# Patient Record
Sex: FEMALE | Race: WHITE | NOT HISPANIC OR LATINO | URBAN - METROPOLITAN AREA
[De-identification: names, ages, dates, MRNs, and addresses within clinical notes are randomized per-mention and may not be internally consistent; named-entity substitution may affect disease eponyms.]

---

## 2021-01-20 ENCOUNTER — IMMUNIZATIONS (OUTPATIENT)
Dept: FAMILY MEDICINE CLINIC | Facility: HOSPITAL | Age: 76
End: 2021-01-20

## 2021-01-20 DIAGNOSIS — Z23 ENCOUNTER FOR IMMUNIZATION: Primary | ICD-10-CM

## 2021-01-20 PROCEDURE — 0001A SARS-COV-2 / COVID-19 MRNA VACCINE (PFIZER-BIONTECH) 30 MCG: CPT

## 2021-01-20 PROCEDURE — 91300 SARS-COV-2 / COVID-19 MRNA VACCINE (PFIZER-BIONTECH) 30 MCG: CPT

## 2021-02-08 ENCOUNTER — IMMUNIZATIONS (OUTPATIENT)
Dept: FAMILY MEDICINE CLINIC | Facility: HOSPITAL | Age: 76
End: 2021-02-08

## 2021-02-08 DIAGNOSIS — Z23 ENCOUNTER FOR IMMUNIZATION: Primary | ICD-10-CM

## 2021-02-08 PROCEDURE — 91300 SARS-COV-2 / COVID-19 MRNA VACCINE (PFIZER-BIONTECH) 30 MCG: CPT

## 2021-02-08 PROCEDURE — 0002A SARS-COV-2 / COVID-19 MRNA VACCINE (PFIZER-BIONTECH) 30 MCG: CPT

## 2021-06-03 ENCOUNTER — OFFICE VISIT (OUTPATIENT)
Dept: DERMATOLOGY | Facility: CLINIC | Age: 76
End: 2021-06-03
Payer: MEDICARE

## 2021-06-03 VITALS — TEMPERATURE: 97.6 F | WEIGHT: 196.5 LBS

## 2021-06-03 DIAGNOSIS — R21 RASH: Primary | ICD-10-CM

## 2021-06-03 PROCEDURE — 88305 TISSUE EXAM BY PATHOLOGIST: CPT | Performed by: STUDENT IN AN ORGANIZED HEALTH CARE EDUCATION/TRAINING PROGRAM

## 2021-06-03 PROCEDURE — 88300 SURGICAL PATH GROSS: CPT | Performed by: STUDENT IN AN ORGANIZED HEALTH CARE EDUCATION/TRAINING PROGRAM

## 2021-06-03 PROCEDURE — 11105 PUNCH BX SKIN EA SEP/ADDL: CPT | Performed by: DERMATOLOGY

## 2021-06-03 PROCEDURE — 88350 IMFLUOR EA ADDL 1ANTB STN PX: CPT | Performed by: DERMATOLOGY

## 2021-06-03 PROCEDURE — 11104 PUNCH BX SKIN SINGLE LESION: CPT | Performed by: DERMATOLOGY

## 2021-06-03 PROCEDURE — 88346 IMFLUOR 1ST 1ANTB STAIN PX: CPT | Performed by: DERMATOLOGY

## 2021-06-03 PROCEDURE — 88313 SPECIAL STAINS GROUP 2: CPT | Performed by: STUDENT IN AN ORGANIZED HEALTH CARE EDUCATION/TRAINING PROGRAM

## 2021-06-03 PROCEDURE — 99203 OFFICE O/P NEW LOW 30 MIN: CPT | Performed by: DERMATOLOGY

## 2021-06-03 RX ORDER — EZETIMIBE 10 MG/1
10 TABLET ORAL DAILY
COMMUNITY
Start: 2021-03-15

## 2021-06-03 RX ORDER — FERROUS SULFATE 325(65) MG
325 TABLET ORAL
COMMUNITY
Start: 2021-04-26 | End: 2021-10-23

## 2021-06-03 RX ORDER — ESCITALOPRAM OXALATE 10 MG/1
10 TABLET ORAL DAILY
COMMUNITY
Start: 2021-03-15

## 2021-06-03 RX ORDER — BUDESONIDE AND FORMOTEROL FUMARATE DIHYDRATE 160; 4.5 UG/1; UG/1
AEROSOL RESPIRATORY (INHALATION)
COMMUNITY
Start: 2021-02-01

## 2021-06-03 RX ORDER — ESCITALOPRAM OXALATE 10 MG/1
10 TABLET ORAL
COMMUNITY
Start: 2021-03-15 | End: 2021-06-17 | Stop reason: SDUPTHER

## 2021-06-03 RX ORDER — BUDESONIDE AND FORMOTEROL FUMARATE DIHYDRATE 160; 4.5 UG/1; UG/1
2 AEROSOL RESPIRATORY (INHALATION) 2 TIMES DAILY
COMMUNITY
Start: 2021-04-10 | End: 2021-06-17 | Stop reason: SDUPTHER

## 2021-06-03 RX ORDER — HYDROXYZINE HYDROCHLORIDE 10 MG/1
TABLET, FILM COATED ORAL
COMMUNITY
Start: 2021-05-23 | End: 2021-06-17 | Stop reason: SDUPTHER

## 2021-06-03 RX ORDER — IPRATROPIUM BROMIDE AND ALBUTEROL SULFATE 2.5; .5 MG/3ML; MG/3ML
3 SOLUTION RESPIRATORY (INHALATION)
COMMUNITY

## 2021-06-03 RX ORDER — GLIMEPIRIDE 4 MG/1
4 TABLET ORAL
COMMUNITY
Start: 2021-05-02

## 2021-06-03 RX ORDER — TIOTROPIUM BROMIDE INHALATION SPRAY 3.12 UG/1
2 SPRAY, METERED RESPIRATORY (INHALATION)
COMMUNITY
Start: 2021-02-08

## 2021-06-03 RX ORDER — CYCLOBENZAPRINE HCL 5 MG
5 TABLET ORAL 3 TIMES DAILY PRN
COMMUNITY
Start: 2021-03-15

## 2021-06-03 RX ORDER — EZETIMIBE 10 MG/1
10 TABLET ORAL
COMMUNITY
Start: 2020-12-17 | End: 2021-06-17 | Stop reason: SDUPTHER

## 2021-06-03 RX ORDER — FLUCONAZOLE 150 MG/1
TABLET ORAL
COMMUNITY
Start: 2021-03-02

## 2021-06-03 RX ORDER — LATANOPROST 50 UG/ML
SOLUTION/ DROPS OPHTHALMIC
COMMUNITY
Start: 2021-05-02 | End: 2021-06-17 | Stop reason: SDUPTHER

## 2021-06-03 RX ORDER — HYDROXYZINE HYDROCHLORIDE 10 MG/1
TABLET, FILM COATED ORAL
COMMUNITY
Start: 2021-02-22

## 2021-06-03 RX ORDER — CLOBETASOL PROPIONATE 0.5 MG/G
OINTMENT TOPICAL
COMMUNITY
Start: 2021-04-25 | End: 2021-06-17 | Stop reason: SDUPTHER

## 2021-06-03 RX ORDER — HYDROCHLOROTHIAZIDE 25 MG/1
25 TABLET ORAL
COMMUNITY
Start: 2021-01-25 | End: 2021-06-17 | Stop reason: SDUPTHER

## 2021-06-03 RX ORDER — LATANOPROST 50 UG/ML
1 SOLUTION/ DROPS OPHTHALMIC
COMMUNITY

## 2021-06-03 RX ORDER — HYDROCHLOROTHIAZIDE 25 MG/1
25 TABLET ORAL DAILY
COMMUNITY
Start: 2021-04-24

## 2021-06-03 RX ORDER — CYCLOBENZAPRINE HCL 5 MG
5 TABLET ORAL
COMMUNITY
Start: 2021-03-15 | End: 2021-06-17 | Stop reason: SDUPTHER

## 2021-06-03 RX ORDER — GLIMEPIRIDE 4 MG/1
4 TABLET ORAL
COMMUNITY
Start: 2021-02-03 | End: 2021-06-17 | Stop reason: SDUPTHER

## 2021-06-03 RX ORDER — UMECLIDINIUM 62.5 UG/1
AEROSOL, POWDER ORAL
COMMUNITY
Start: 2021-02-10 | End: 2022-05-06

## 2021-06-03 NOTE — PATIENT INSTRUCTIONS
1  RASH      Assessment and Plan:  Based on a thorough discussion of this condition and the management approach to it (including a comprehensive discussion of the known risks, side effects and potential benefits of treatment), the patient (family) agrees to implement the following specific plan:   Discussed with patient will get biopsy today and once we receive results we can go forward with treatments   Discussed probably starting oral medications like gabapentin, Norethin, Phototherapy, or Dupixent injections   Recommend patient to follow up in 2 weeks for check up  PROCEDURE NOTE:  PUNCH BIOPSY        Complications:  None      Specimen has been sent for review by Dermatopathology  Plan:  1  Instructed to keep the wound dry and covered for 24-48h and clean thereafter  2  Warning signs of infection were reviewed  3  Recommended that the patient use acetaminophen as needed for pain  4  Sutures if any should be removed in 14 days      Standard post-procedure care has been explained and has been included in written form within the patient's copy of Informed Consent          PROCEDURE NOTE:  PUNCH BIOPSY

## 2021-06-03 NOTE — PROGRESS NOTES
[facility] Note     Patient Name: Rose Griffith  Encounter Date: 06/03/2021     Have you been cared for by a [facility] in the last 3 years and, if so, which one? No    · Have you traveled outside of the [address] [place] [address] in the past 3 months or outside of the [facility] area in the last 2 weeks? No     May we call your Preferred Phone number to discuss your specific medical information? Yes     May we leave a detailed message that includes your specific medical information? Yes      Today's Chief Concerns:   Concern #1:  Rash    Concern #2:      Past Medical History:  Have you personally ever had or currently have any of the following? · Skin cancer (such as Melanoma, Basal Cell Carcinoma, Squamous Cell Carcinoma? (If Yes, please provide more detail)- No  · Eczema: No  · Psoriasis: No  · HIV/AIDS: No  · Hepatitis B or C: No  · Tuberculosis: No  · Systemic Immunosuppression such as Diabetes, Biologic or Immunotherapy, Chemotherapy, Organ Transplantation, Bone Marrow Transplantation (If YES, please provide more detail): YES, type 2 diabetes   · Radiation Treatment (If YES, please provide more detail): No  · Any other major medical conditions/concerns? (If Yes, which types)- YES, asthma     Social History:     What is/was your primary occupation? Retired      What are your hobbies/past-times? n/a    Family History:  Have any of your "first degree relatives" (parent, brother, sister, or child) had any of the following       · Skin cancer such as Melanoma or Merkel Cell Carcinoma or Pancreatic Cancer? No  · Eczema, Asthma, Hay Fever or Seasonal Allergies: YES, Asthma in the family   · Psoriasis or Psoriatic Arthritis: No  · Do any other medical conditions seem to run in your family? If Yes, what condition and which relatives?   No    Current Medications:   (please update all dermatological medications before printing patient's AVS!)      Current Outpatient Medications:     budesonide-formoterol (Symbicort) 160-4 5 mcg/act inhaler, TAKE 2 PUFFS BY MOUTH TWICE A DAY, Disp: , Rfl:     clobetasol (TEMOVATE) 0 05 % ointment, APPLY TO AREA TWICE A DAY, Disp: , Rfl:     cyclobenzaprine (FLEXERIL) 5 mg tablet, Take 5 mg by mouth, Disp: , Rfl:     escitalopram (LEXAPRO) 10 mg tablet, Take 10 mg by mouth, Disp: , Rfl:     escitalopram (LEXAPRO) 10 mg tablet, Take 10 mg by mouth daily, Disp: , Rfl:     ezetimibe (ZETIA) 10 mg tablet, Take 10 mg by mouth, Disp: , Rfl:     ferrous sulfate 325 (65 Fe) mg tablet, Take 325 mg by mouth, Disp: , Rfl:     fluticasone-vilanterol (Breo Ellipta) 200-25 MCG/INH inhaler, 1 PUFF ONCE DAILY  , Disp: , Rfl:     glimepiride (AMARYL) 4 mg tablet, Take 4 mg by mouth, Disp: , Rfl:     glimepiride (AMARYL) 4 mg tablet, Take 4 mg by mouth daily with breakfast, Disp: , Rfl:     hydrochlorothiazide (HYDRODIURIL) 25 mg tablet, Take 25 mg by mouth, Disp: , Rfl:     hydrOXYzine HCL (ATARAX) 10 mg tablet, TAKE 1 TABLET BY MOUTH TWICE A DAY AS NEEDED FOR ITCHING (TAKE AT NIGHT BEFORE BED)  , Disp: , Rfl:     hydrOXYzine HCL (ATARAX) 10 mg tablet, , Disp: , Rfl:     ipratropium-albuterol (DUO-NEB) 0 5-2 5 mg/3 mL nebulizer solution, Inhale 3 mL, Disp: , Rfl:     latanoprost (XALATAN) 0 005 % ophthalmic solution, INSTILL 1 DROP INTO BOTH EYES IN THE EVENING, Disp: , Rfl:     metFORMIN (GLUCOPHAGE) 1000 MG tablet, Take 1,000 mg by mouth 2 (two) times a day with meals Breakfast and dinner, Disp: , Rfl:     metoprolol tartrate (LOPRESSOR) 25 mg tablet, , Disp: , Rfl:     metoprolol tartrate (LOPRESSOR) 25 mg tablet, Take 25 mg by mouth, Disp: , Rfl:     Symbicort 160-4 5 MCG/ACT inhaler, Inhale 2 puffs 2 (two) times a day, Disp: , Rfl:     tiotropium (Spiriva Respimat) 2 5 MCG/ACT AERS inhaler, Inhale 2 puffs, Disp: , Rfl:     umeclidinium bromide (Incruse Ellipta) 62 5 mcg/inh AEPB inhaler, every 24 hours  , Disp: , Rfl:     Breo Ellipta 200-25 MCG/INH inhaler, TAKE 1 PUFF BY MOUTH DAILY, Disp: , Rfl:     cyclobenzaprine (FLEXERIL) 5 mg tablet, Take 5 mg by mouth 3 (three) times a day as needed, Disp: , Rfl:     ezetimibe (ZETIA) 10 mg tablet, Take 10 mg by mouth daily, Disp: , Rfl:     fluconazole (DIFLUCAN) 150 mg tablet, TAKE 1 TABLET BY MOUTH AS ONE DOSE, Disp: , Rfl:     hydrochlorothiazide (HYDRODIURIL) 25 mg tablet, Take 25 mg by mouth daily, Disp: , Rfl:     latanoprost (XALATAN) 0 005 % ophthalmic solution, Administer 1 drop to both eyes once at bedtime, Disp: , Rfl:       Review of Systems:  Have you recently had or currently have any of the following? If YES, what are you doing for the problem? · Fever, chills or unintended weight loss: No  · Sudden loss or change in your vision: No  · Nausea, vomiting or blood in your stool: No  · Painful or swollen joints: No  · Wheezing or cough: No  · Changing mole or non-healing wound: No  · Nosebleeds: No  · Excessive sweating: No  · Easy or prolonged bleeding? No  · Over the last 2 weeks, how often have you been bothered by the following problems? · Taking little interest or pleasure in doing things: 1 - Not at All  · Feeling down, depressed, or hopeless: 1 - Not at All  · Rapid heartbeat with epinephrine:  No    · FEMALES ONLY:    · Are you pregnant or planning to become pregnant? No  · Are you currently or planning to be nursing or breast feeding? No    · Any known allergies? Allergies   Allergen Reactions    Penicillins Anaphylaxis and Rash    Irbesartan Angioedema    Niacin Rash    Statins Rash         Physical Exam:     Was a chaperone (Derm Clinical Assistant) present throughout the entire Physical Exam? Yes     Did the Dermatology Team specifically  the patient on the importance of a Full Skin Exam to be sure that nothing is missed clinically?  Yes}  o Did the patient ultimately request or accept a Full Skin Exam?  Yes  o Did the patient specifically refuse to have the areas "under-the-bra" examined by the Dermatologist? No  o Did the patient specifically refuse to have the areas "under-the-underwear" examined by the Dermatologist? No    CONSTITUTIONAL:   Vitals:    06/03/21 0941   Temp: 97 6 °F (36 4 °C)   TempSrc: Tympanic   Weight: 89 1 kg (196 lb 8 oz)           PSYCH: Normal mood and affect  EYES: Normal conjunctiva  ENT: Normal lips and oral mucosa  CARDIOVASCULAR: No edema  RESPIRATORY: Normal respirations  HEME/LYMPH/IMMUNO:  No regional lymphadenopathy except as noted below in "ASSESSMENT AND PLAN BY DIAGNOSIS"    SKIN:  FULL ORGAN SYSTEM EXAM  Hair, Scalp, Ears, Face Normal except as noted below in Assessment   Neck, Cervical Chain Nodes Normal except as noted below in Assessment   Right Arm/Hand/Fingers Normal except as noted below in Assessment   Left Arm/Hand/Fingers Normal except as noted below in Assessment   Chest/Breasts/Axillae Viewed areas Normal except as noted below in Assessment   Abdomen, Umbilicus Normal except as noted below in Assessment   Back/Spine Normal except as noted below in Assessment   Groin/Genitalia/Buttocks NOT EXAMINED   Right Leg, Foot, Toes Normal except as noted below in Assessment   Left Leg, Foot, Toes Normal except as noted below in Assessment        Assessment and Plan by Diagnosis:    History of Present Condition:     Duration:  How long has this been an issue for you?    o  2 years now    Location Affected:  Where on the body is this affecting you?    o  All over the  Body    Quality:  Is there any bleeding, pain, itch, burning/irritation, or redness associated with the skin lesion? o  itchy, redness, painful, bleeding    Severity:  Describe any bleeding, pain, itch, burning/irritation, or redness on a scale of 1 to 10 (with 10 being the worst)  o  8   Timing:  Does this condition seem to be there pretty constantly or do you notice it more at specific times throughout the day?     o  comes and goes  Context:  Have you ever noticed that this condition seems to be associated with specific activities you do?    o  clothing starts to make skin itchy, unsure what is causing itchy   Modifying Factors:    o Anything that seems to make the condition worse?    -  clothing starts to make skin itchy, unsure what is causing itchy  o What have you tried to do to make the condition better? -  Clobetasol Propionate Ointment, hydroxyzine 10mg tablet, 80 patch test and came back positive for hay fever, and previously biopsied but patient states nothing came back for it   Associated Signs and Symptoms:  Does this skin lesion seem to be associated with any of the following:  o  SL AMB DERM SIGNS AND SYMPTOMS: Redness and Itching and Scratching           1  RASH    Physical Exam:   (Anatomic Location); (Size and Morphological Description); (Differential Diagnosis):  o Arms, bilateral legs and torso; Indurated nodules  Many lesions resolving with hyperpigmentation  Differential Diagnosis: Prurigo nodularis versus perforating folliculitis  Doubt DH or bullous diseases   Pertinent Positives:   Pertinent Negatives: Additional History of Present Condition:  Patient was given Prednisone in the past and it seemed to helped  Patient is currently using prescribed Hydroxyzine 10mg tablets, Clobetasol Propionate  At old dermatologist patient was Patch tested and it came back positive for hay fever       Assessment and Plan:  Based on a thorough discussion of this condition and the management approach to it (including a comprehensive discussion of the known risks, side effects and potential benefits of treatment), the patient (family) agrees to implement the following specific plan:   Discussed with patient will get biopsy today and once we receive results we can go forward with treatments   Discussed probably starting oral medications like gabapentin, and/or Phototherapy    She will inquire if available in [place] area  [facility] is valid consideration if all else fails   Recommend patient to follow up in 2 weeks for check up  PROCEDURE NOTE:  PUNCH BIOPSY      Performing Physician: Dr Montiel    Anatomic Location; Clinical Description with size (cm); Pre-Op Diagnosis:   o A; Left Lateral Arm (lesional):  indurated nodules on bilateral arms, legs and torso: Differential Diagnosis: Prurigo Nodularis versus perforating disorder versus Dermatitis herpetis  Anesthesia: 1% xylocaine with epi       Topical anesthesia: None       Indications: To indicate diagnosis and management plan  Procedure Details     Patient informed of the risks (including bleeding,scaring and infection) and benefits of the procedure explained  Verbal and written informed consent obtained  The area was prepped and draped in the usual fashion  Anesthesia was obtained with 1% lidocaine with epinephrine  The skin was then stretched perpendicular to the skin tension lines and a punch biopsy to an appropriate sampling depth was obtained with a 4 mm punch with a forceps and iris scissors  Hemostasis was obtained with 4-0 Prolene x 2 sutures  Complications:  None      Specimen has been sent for review by Dermatopathology  Plan:  1  Instructed to keep the wound dry and covered for 24-48h and clean thereafter  2  Warning signs of infection were reviewed  3  Recommended that the patient use acetaminophen as needed for pain  4  Sutures if any should be removed in 14 days      Standard post-procedure care has been explained and has been included in written form within the patient's copy of Informed Consent  PROCEDURE NOTE:  PUNCH BIOPSY      Performing Physician: Dr Montiel    Anatomic Location; Clinical Description with size (cm); Pre-Op Diagnosis:      o B; DIFF;  Left Lateral Arm (Arleth lesional):  indurated nodules on bilateral arms, legs and torso: Differential Diagnosis: Prurigo Nodularis versus perforating disorder versus Dermatitis herpetis  Anesthesia: 1% xylocaine with epi       Topical anesthesia: None       Indications: To indicate diagnosis and management plan  Procedure Details     Patient informed of the risks (including bleeding,scaring and infection) and benefits of the procedure explained  Verbal and written informed consent obtained  The area was prepped and draped in the usual fashion  Anesthesia was obtained with 1% lidocaine with epinephrine  The skin was then stretched perpendicular to the skin tension lines and a punch biopsy to an appropriate sampling depth was obtained with a 3 mm punch with a forceps and iris scissors  Hemostasis was obtained with 4-0 Prolene x 1 sutures  Complications:  None      Specimen has been sent for review by Dermatopathology  Plan:  1  Instructed to keep the wound dry and covered for 24-48h and clean thereafter  2  Warning signs of infection were reviewed  3  Recommended that the patient use acetaminophen as needed for pain  4  Sutures if any should be removed in 14 days      Standard post-procedure care has been explained and has been included in written form within the patient's copy of Informed Consent      Scribe Attestation    I,:  Shaq Denny MA am acting as a scribe while in the presence of the attending physician :       I,:  Prasad To MD personally performed the services described in this documentation    as scribed in my presence :

## 2021-06-08 NOTE — RESULT ENCOUNTER NOTE
DERMATOPATHOLOGY RESULT NOTE    Results reviewed by ordering physician  Called patient to personally discuss results  Non working number      Instructions for Clinical Derm Team:   (remember to route Result Note to appropriate staff):    Patient has non working number  preliminiary biopsy shows allergic dermatitis but immuno still pending  Result & Plan by Specimen:    Specimen A: benign  Plan: await immuno        Final Diagnosis  A  Skin, left lateral arm, punch biopsy:     Spongiotic dermatitis with papillary dermal edema and superficial perivascular lymphocytic infiltrate with scattered eosinophils (see note)      Note: Findings consistent with trauma/irritation are seen  In the appropriate clinical context, the histopathologic findings are consistent with eczematous dermatitis or allergic contact dermatitis  Clinical pathological correlation, including immunofluorescence studies, is advised  Pathogenic microorganisms are not seen on PAS stain  Multiple levels examined            B  Skin, left lateral arm, DIF:     Specimen entirely sent to [facility] for immunofluorescence analysis  A separate report will be issued by [facility] following completion of their studies

## 2021-06-09 ENCOUNTER — TELEPHONE (OUTPATIENT)
Dept: DERMATOLOGY | Facility: CLINIC | Age: 76
End: 2021-06-09

## 2021-06-09 NOTE — TELEPHONE ENCOUNTER
Patient is being seen by you Dr Liam Mejia on 6/17/2021 to follow up on rash and suture removals  (on OVS slot)

## 2021-06-11 LAB — MISCELLANEOUS LAB TEST RESULT: NORMAL

## 2021-06-17 ENCOUNTER — OFFICE VISIT (OUTPATIENT)
Dept: DERMATOLOGY | Facility: CLINIC | Age: 76
End: 2021-06-17
Payer: MEDICARE

## 2021-06-17 DIAGNOSIS — Z48.02 VISIT FOR SUTURE REMOVAL: ICD-10-CM

## 2021-06-17 DIAGNOSIS — L20.9 ATOPIC DERMATITIS, UNSPECIFIED TYPE: Primary | ICD-10-CM

## 2021-06-17 PROCEDURE — 99213 OFFICE O/P EST LOW 20 MIN: CPT | Performed by: DERMATOLOGY

## 2021-06-17 RX ORDER — CLOBETASOL PROPIONATE 0.5 MG/G
OINTMENT TOPICAL 2 TIMES DAILY
Qty: 60 G | Refills: 0 | Status: SHIPPED | OUTPATIENT
Start: 2021-06-17 | End: 2021-09-20

## 2021-06-17 NOTE — PATIENT INSTRUCTIONS
Assessment and Plan:  Based on a thorough discussion of this condition and the management approach to it (including a comprehensive discussion of the known risks, side effects and potential benefits of treatment), the patient (family) agrees to implement the following specific plan:   We will order Dupixent, we will work on prior authorization with your insurance and will we contact you with response   Side effects and risk discussed     Continue to apply Clobetasol only to thick bumps, until Dupixent is approved  Assessment and Plan:   Atopic Dermatitis is a chronic, itchy skin condition that is very common in children but may occur at any age  It is also known as eczema or atopic eczema   It is the most common form of dermatitis  Atopic dermatitis usually occurs in people who have an atopic tendency    This means they may develop any or all of these closely linked conditions: Atopic dermatitis, asthma, hay fever (allergic rhinitis), eosinophilic esophagitis, and gastroenteritis  Often these conditions run within families with a parent, child or sibling also affected  A family history of asthma, eczema or hay fever is particularly useful in diagnosing atopic dermatitis in infants  Atopic dermatitis arises because of a complex interaction of genetic and environmental factors  These include defects in skin barrier function making the skin more susceptible to irritation by soap and other contact irritants, the weather, temperature and non-specific triggers  There is also an element of immune system dysregulation that is often present  By definition, it is chronic and has a "waxing-waning" nature; flares should be expected but with good education and treatment strategies can be minimized  Some specific tips we discussed:   Dry skin care     Usingonly mild cleansers (hypoallergenic and without fragrances) and fragrance free detergent (not unscented products which contain a masking agent); we discussed avoiding irritants/fragranced products   The importance of regular application of moisturizers daily (at least 3 times a day)   The known and theoretical side effects of steroids at length, including but not limited to atrophy of skin and increased pressure in eye (glaucoma) and clouding of the eye's lens (cataracts) if used in or around the eye for extended durations   The specific over-the-counter interventions and medications   Side effects, risks and benefits of topical and oral medications discussed  EDUCATION AS INTERVENTION! WHAT IS ATOPIC DERMATITIS? Atopic dermatitis (also called eczema) is a condition of the skin where the skin is dry, red, and itchy  The main function of the skin is to provide a barrier from the environment and is also the first defense of the immune system  In atopic dermatitis the skin barrier is decreased or disrupted, and the skin is easily irritated  As a result, moisture escapes the skin more easily, and environmental allergens and microbes can enter the skin more easily  Consequently, the skin's immune system is altered  If there are increased allergic type cells in the skin, the skin may become red and hyper-excitable   This leads to itching and a subsequent rash  WHY DO PEOPLE GET ATOPIC DERMATITIS? There is no single answer because many factors are involved  It is likely a combination of genetic makeup and environmental triggers and/or exposures  Excessive drying or sweating of the skin, Irritating soaps, dust mites, and pet dander are some of the more common triggers  There is no blood test that can be done to confirm this diagnosis  The history and appearance of the skin is usually sufficient for a diagnosis  However, in some cases if the rash does not fit with the history or respond appropriately to treatment, a skin biopsy may be helpful    Many children do outgrow atopic dermatitis or get better; however, many continue to have sensitive skin into adulthood  Asthma and hay fever are often seen in many patients with atopic dermatitis; however, asthma flares do not necessarily occur at the same time as skin flares  PREVENTING FLARES OF ATOPIC DERMATITIS  The first step is to maintain the skin's barrier function  Keep the skin well moisturized  Avoid irritants and triggers  Use prescribed medicine when there are red or rough areas to help the skin to return to normal as quickly as possible  Try to limit scratching  If you keep the skin well moisturized, and avoid coming in contact with things you know irritate your child's skin, there will be less flares  However, some flares of atopic dermatitis are beyond your control  You should work with your health care provider to come up with a plan that minimizes flares while minimizing long term use of medications that suppress the immune system  WHAT ARE SOME OF THE TRIGGERS? Triggers are different for different people  The most common triggers are:   Heat and sweat for some individuals, cold weather for others   House dust mites, pet fur   Wool; synthetic fabrics like nylon; dyed fabrics   Tobacco smoke    Fragrances in: shampoos, soaps, lotions, laundry detergents, fabric softeners   Saliva or prolonged exposure to water  WHAT ABOUT FOOD ALLERGIES? This is a very controversial topic, as many believe that food allergies are responsible for skin flares  In some cases, specific foods may cause worsening of atopic dermatitis; however this occurs in a minority of cases and usually happens within a few hours of ingestion  While food allergy is more common in children with eczema, foods are specific triggers for flares in only a small percentage of children  If you notice that the skin flares after certain foods you can see if eliminating one food at time makes a difference, as long as your child can still enjoy a well-balanced diet     There are blood (RAST) and skin (PRICK) tests that can check for allergies, but they are often positive in children who are not truly allergic  Therefore it is important that you work with your allergist and dermatologist to determine which foods are relevant and causing true symptoms  Extreme food elimination diets without the guidance of your doctor, which have become more popular in recent years, may even result in worsening of the skin rash due to malnutrition and avoidance of essential nutrients  TREATMENT  Treatments are aimed at minimizing exposure to irritating factors and decreasing  the skin inflammation which results in an itchy rash  There are many different treatment options, which depend on your child's rash, its location, and severity  Topical treatments include corticosteroids and steroid-like creams such as Protopic, Elidel, and Eucrisa, which are believed to not thin the skin  Please read the discussions below regarding risks and benefits of all of these creams  Occasionally bacterial or viral infections can occur which flare the skin and require oral and/or topical antibiotics or antivirals  In some cases bleach baths 2-3 times weekly can be helpful to prevent recurrent infection  For severe disease, strong oral medications such as corticosteroids, methotrexate or azathioprine (Imuran) may be needed  These medications require close monitoring and follow-up  You should discuss the risks/ benefits/alternatives of these medications with your health care provider to come up with the best treatment plan for your child  1) Use moisturizer all over the entire body at least THREE TIMES a day  This keeps the skin moisturized to restore the barrier function  Find a cream or ointment that your child likes - this is the most important  The medicines do not work in the bottle  The thicker the moisturizer, generally the better barrier it provides    Ointments often moisturize better than creams; and creams work better than lotions  Lotions are more useful during the summer when thick greasy ointments are uncomfortable  If you put moisturizer on the skin after bathing, while the skin is damp, it is twice as effective  The moisturizer provides a seal holding the water in the skin  You may bathe your child in warm - not hot - water, for short periods of time (no more than 5-10 minutes at a time) once a day if they like  Lightly pat your child dry with a towel and, while the skin is still damp, (within 3 minutes) apply a moisturizer from head to toe  If your child is using a medicated cream, apply it and allow it to absorb completely BEFORE you apply the moisturizer  2) Apply the prescription medication TWICE A DAY to only the red, rough areas on the skin OR AS [place]  Put the medication on your fingers and gently rub it into the areas  Usually the medicine will help an area within a few days time  Try to put the medicine on for two days after you have noticed that the redness is no longer present; this will help the redness from returning  The severity of the rash and the strength and usage of the medication will determine how quickly you see improvement  It is important that you do not overuse steroid creams, and if you notice a thin, shiny appearance to the skin or broken blood vessels, you should stop using the cream and consult your health care provider regarding possible overuse/overthinning of the skin  The face, armpits and groin have particularly thin and sensitive skin and are therefore most at risk for bad results if steroids are over-used in these sites  3) Avoid triggers  Some children have specific things that trigger itching and rashes, while others may have none that can be identified  It may require a little bit of trial and error to see what applies to your child  Also, triggers can change over time for your child   The most common triggers are listed above; start with these  Avoid the use of fabric softeners in the washing machine or dryer sheets (unless they are fragrance-free)  Try to use laundry detergents, soaps and shampoos that are fragrance-free  You may find it helpful to double-rinse your clothes  Some children are sensitive to house dust mites and they may benefit from a plastic mattress wrap  While food allergy is more common in children with eczema, foods are specific triggers for flares in only a small percentage of children  If you notice that the skin flares after certain foods you can see if eliminating one food at time makes a difference, as long as your child can still enjoy a well-balanced diet  4) Consider using a medication like an anti-histamine by mouth to help control the itching  Scratching only makes the skin more reactive and the barrier function even more disrupted  It can cause both children and their parents to lose sleep! There are different types of anti-itch medications  Some cause more drowsiness than others  Both types are acceptable depending on your child and your preference  Start with Benadryl and if that does not work, ask for a prescription antihistamine      5) About the prescription creams:  Corticosteroid creams and ointments (generally things with "-one" or "hao" on the end of their names): The strength of the cream or ointment depends on the name of the active ingredient  The numbers at the end do not indicate the relative strength  Thus triamcinolone 0 1% ointment, considered a mid-strength corticosteroid, is much stronger than hydrocortisone 1% even though the number following the name is much lower  Topical corticosteroids are very effective in treating atopic dermatitis  When used in the manner prescribed (to rashy areas of skin and for no more than a few weeks at a time to any one area) they are very safe    These are corticosteroids and are anti-inflammatory, not the anabolic steroids like those used illegally by some athletes  Topical non-steroid creams and ointments (immunomodulators): These creams and ointments are also called topical calcineurin inhibitors (TCIs)  These include Protopic ointment and Elidel cream  Crisaborole 2% Xu Pineda) is a prescription ointment that targets an enzyme called PDE4 (phosphodiesterase 4)  It is used on the skin topically to treat mild-to-moderate eczema in adults and children 3years of age and older  In total, these nonsteroidal prescriptions are used to help decrease itching and redness in the skin  They are not as strong as most steroid creams; however, it is believed that they do not thin the skin when overused  They are generally used as second-line medications, though they may be used alone or in conjunction with topical steroids  In sensitive areas such as the face, underarms or groin, they are often recommended  They can sting inflamed skin, but are generally well tolerated once the skin is healing  The FDA placed a black-box warning on both Elidel and Protopic in 2006 based on animal studies using the medications  Some animals developed skin cancer and lymphoma  Subsequently, the FDA released a statement that there is no causal relationship between the two medications and cancer  Because of this concern, there are ongoing studies to evaluate this relationship in humans  So far, there are studies that support the safety of these medications  One showed that the rates of cancer in patient using these medications topically were less than the rates of the general population and another showed that in patient's using the medication over a large area of the body, the levels of the medication in the blood was undetectable      As for Eucrisa, this product is only approved for the topical treatment of mild-to-moderate eczema in patients 3years of age and older; use of the medication in kids younger than 2 is considered off label and has not been formally studied  Burning and stinging are the most commonly reported side effects of this medication  Rarely, this product has been known to cause hives and hypersensitivity reactions; discontinue its use if you develop severe itching, swelling, or redness in the area of application

## 2021-06-17 NOTE — PROGRESS NOTES
[facility] Follow Up Note    Patient Name: Rose Griffith  Encounter Date: 06/17/21    Today's Chief Concerns:   Concern #1:  Follow up       Current Medications:    Current Outpatient Medications:     liraglutide (SAXENDA) injection, Inject 1 8 mg under the skin, Disp: , Rfl:     Breo Ellipta 200-25 MCG/INH inhaler, TAKE 1 PUFF BY MOUTH DAILY, Disp: , Rfl:     budesonide-formoterol (Symbicort) 160-4 5 mcg/act inhaler, TAKE 2 PUFFS BY MOUTH TWICE A DAY, Disp: , Rfl:     clobetasol (TEMOVATE) 0 05 % ointment, APPLY TO AREA TWICE A DAY, Disp: , Rfl:     cyclobenzaprine (FLEXERIL) 5 mg tablet, Take 5 mg by mouth 3 (three) times a day as needed, Disp: , Rfl:     escitalopram (LEXAPRO) 10 mg tablet, Take 10 mg by mouth daily, Disp: , Rfl:     ezetimibe (ZETIA) 10 mg tablet, Take 10 mg by mouth daily, Disp: , Rfl:     ferrous sulfate 325 (65 Fe) mg tablet, Take 325 mg by mouth, Disp: , Rfl:     fluconazole (DIFLUCAN) 150 mg tablet, TAKE 1 TABLET BY MOUTH AS ONE DOSE, Disp: , Rfl:     glimepiride (AMARYL) 4 mg tablet, Take 4 mg by mouth daily with breakfast, Disp: , Rfl:     hydrochlorothiazide (HYDRODIURIL) 25 mg tablet, Take 25 mg by mouth daily, Disp: , Rfl:     hydrOXYzine HCL (ATARAX) 10 mg tablet, TAKE 1 TABLET BY MOUTH TWICE A DAY AS NEEDED FOR ITCHING (TAKE AT NIGHT BEFORE BED)  , Disp: , Rfl:     ipratropium-albuterol (DUO-NEB) 0 5-2 5 mg/3 mL nebulizer solution, Inhale 3 mL, Disp: , Rfl:     latanoprost (XALATAN) 0 005 % ophthalmic solution, Administer 1 drop to both eyes once at bedtime, Disp: , Rfl:     metoprolol tartrate (LOPRESSOR) 25 mg tablet, , Disp: , Rfl:     tiotropium (Spiriva Respimat) 2 5 MCG/ACT AERS inhaler, Inhale 2 puffs, Disp: , Rfl:     umeclidinium bromide (Incruse Ellipta) 62 5 mcg/inh AEPB inhaler, every 24 hours  , Disp: , Rfl:     CONSTITUTIONAL:   There were no vitals filed for this visit        Specific Alerts:    Have you been seen by a [facility]ologist in the last 3 years? YES    Are you pregnant or planning to become pregnant? No    Are you currently or planning to be nursing or breast feeding? No    Allergies   Allergen Reactions    Penicillins Anaphylaxis and Rash    Irbesartan Angioedema    Niacin Rash    Statins Rash       May we call your Preferred Phone number to discuss your specific medical information? YES    May we leave a detailed message that includes your specific medical information? YES    Have you traveled outside of the [facility] in the past 3 months? No    Do you currently have a pacemaker or defibrillator? No    Do you have any artificial heart valves, joints, plates, screws, rods, stents, pins, etc? No    Do you require any medications prior to a surgical procedure? No    Are you taking any medications that cause you to bleed more easily ("blood thinners") No    Have you ever experienced a rapid heartbeat with epinephrine? No    Review of Systems:  Have you recently had or currently have any of the following?     · Fever or chills: No  · Night Sweats: No  · Headaches: No  · Weight Gain: No  · Weight Loss: No  · Blurry Vision: No  · Nausea: No  · Vomiting: No  · Diarrhea: No  · Blood in Stool: No  · Abdominal Pain: No  · Itchy Skin: YES  · Painful Joints: No  · Swollen Joints: No  · Muscle Pain: No  · Irregular Mole: No  · Sun Burn: No  · Dry Skin: YES  · Skin Color Changes: No  · Scar or Keloid: No  · Cold Sores/Fever Blisters: No  · Bacterial Infections/MRSA: No  · Anxiety: No  · Depression: No  · Suicidal or Homicidal Thoughts: No      PSYCH: Normal mood and affect  EYES: Normal conjunctiva  ENT: Normal lips and oral mucosa  CARDIOVASCULAR: No edema  RESPIRATORY: Normal respirations  HEME/LYMPH/IMMUNO:  No regional lymphadenopathy except as noted below in ASSESSMENT AND PLAN BY DIAGNOSIS    FULL ORGAN SYSTEM SKIN EXAM (SKIN)   Hair, Scalp, Ears, Face Normal except as noted below in Assessment   Neck, Cervical Chain Nodes Normal except as noted below in Assessment   Right Arm/Hand/Fingers Normal except as noted below in Assessment   Left Arm/Hand/Fingers Normal except as noted below in Assessment   Chest/Breasts/Axillae Viewed areas Normal except as noted below in Assessment   Abdomen, Umbilicus Normal except as noted below in Assessment   Back/Spine Normal except as noted below in Assessment   Groin/Genitalia/Buttocks Viewed areas Normal except as noted below in Assessment   Right Leg, Foot, Toes Normal except as noted below in Assessment   Left Leg, Foot, Toes Normal except as noted below in Assessment     ATOPIC DERMATITIS of unknown etiology   Physical Exam:   Anatomic Location Affected & Morphological Description:  Arms, bilateral legs and torso; Indurated nodules  Many lesions resolving with hyperpigmentation  Differential Diagnosis: Prurigo nodularis versus perforating folliculitis  Doubt DH or bullous diseases   Severity: moderate   Pertinent Positives:   Pertinent Negatives: Additional History of Present Condition:  Patient is present to follow up on ongoing rash, previously biopsied present today to follow up on results, stopped metformin  Assessment and Plan:  Based on a thorough discussion of this condition and the management approach to it (including a comprehensive discussion of the known risks, side effects and potential benefits of treatment), the patient (family) agrees to implement the following specific plan:   We will order Dupixent, we will work on prior authorization with your insurance and will we contact you with response   Side effects and risk discussed     Continue to apply Clobetasol only to thick bumps, until Dupixent is approved  Indications for use is treatment failure with topical steroids, phototherapy  And risks of ongoing secondary infection of legs    I note that due to diamtes and comordities other forms of immunosuppression are not clinical option  Assessment and Plan:   Atopic Dermatitis is a chronic, itchy skin condition that is very common in children but may occur at any age  It is also known as eczema or atopic eczema   It is the most common form of dermatitis  Atopic dermatitis usually occurs in people who have an atopic tendency    This means they may develop any or all of these closely linked conditions: Atopic dermatitis, asthma, hay fever (allergic rhinitis), eosinophilic esophagitis, and gastroenteritis  Often these conditions run within families with a parent, child or sibling also affected  A family history of asthma, eczema or hay fever is particularly useful in diagnosing atopic dermatitis in infants  Atopic dermatitis arises because of a complex interaction of genetic and environmental factors  These include defects in skin barrier function making the skin more susceptible to irritation by soap and other contact irritants, the weather, temperature and non-specific triggers  There is also an element of immune system dysregulation that is often present  By definition, it is chronic and has a "waxing-waning" nature; flares should be expected but with good education and treatment strategies can be minimized  Some specific tips we discussed:   Dry skin care   Usingonly mild cleansers (hypoallergenic and without fragrances) and fragrance free detergent (not unscented products which contain a masking agent); we discussed avoiding irritants/fragranced products   The importance of regular application of moisturizers daily (at least 3 times a day)   The known and theoretical side effects of steroids at length, including but not limited to atrophy of skin and increased pressure in eye (glaucoma) and clouding of the eye's lens (cataracts) if used in or around the eye for extended durations   The specific over-the-counter interventions and medications     Side effects, risks and benefits of topical and oral medications discussed  EDUCATION AS INTERVENTION! WHAT IS ATOPIC DERMATITIS? Atopic dermatitis (also called eczema) is a condition of the skin where the skin is dry, red, and itchy  The main function of the skin is to provide a barrier from the environment and is also the first defense of the immune system  In atopic dermatitis the skin barrier is decreased or disrupted, and the skin is easily irritated  As a result, moisture escapes the skin more easily, and environmental allergens and microbes can enter the skin more easily  Consequently, the skin's immune system is altered  If there are increased allergic type cells in the skin, the skin may become red and hyper-excitable   This leads to itching and a subsequent rash  WHY DO PEOPLE GET ATOPIC DERMATITIS? There is no single answer because many factors are involved  It is likely a combination of genetic makeup and environmental triggers and/or exposures  Excessive drying or sweating of the skin, Irritating soaps, dust mites, and pet dander are some of the more common triggers  There is no blood test that can be done to confirm this diagnosis  The history and appearance of the skin is usually sufficient for a diagnosis  However, in some cases if the rash does not fit with the history or respond appropriately to treatment, a skin biopsy may be helpful  Many children do outgrow atopic dermatitis or get better; however, many continue to have sensitive skin into adulthood  Asthma and hay fever are often seen in many patients with atopic dermatitis; however, asthma flares do not necessarily occur at the same time as skin flares  PREVENTING FLARES OF ATOPIC DERMATITIS  The first step is to maintain the skin's barrier function  Keep the skin well moisturized  Avoid irritants and triggers  Use prescribed medicine when there are red or rough areas to help the skin to return to normal as quickly as possible  Try to limit scratching       If you keep the skin well moisturized, and avoid coming in contact with things you know irritate your child's skin, there will be less flares  However, some flares of atopic dermatitis are beyond your control  You should work with your health care provider to come up with a plan that minimizes flares while minimizing long term use of medications that suppress the immune system  WHAT ARE SOME OF THE TRIGGERS? Triggers are different for different people  The most common triggers are:   Heat and sweat for some individuals, cold weather for others   House dust mites, pet fur   Wool; synthetic fabrics like nylon; dyed fabrics   Tobacco smoke    Fragrances in: shampoos, soaps, lotions, laundry detergents, fabric softeners   Saliva or prolonged exposure to water  WHAT ABOUT FOOD ALLERGIES? This is a very controversial topic, as many believe that food allergies are responsible for skin flares  In some cases, specific foods may cause worsening of atopic dermatitis; however this occurs in a minority of cases and usually happens within a few hours of ingestion  While food allergy is more common in children with eczema, foods are specific triggers for flares in only a small percentage of children  If you notice that the skin flares after certain foods you can see if eliminating one food at time makes a difference, as long as your child can still enjoy a well-balanced diet  There are blood (RAST) and skin (PRICK) tests that can check for allergies, but they are often positive in children who are not truly allergic  Therefore it is important that you work with your allergist and dermatologist to determine which foods are relevant and causing true symptoms  Extreme food elimination diets without the guidance of your doctor, which have become more popular in recent years, may even result in worsening of the skin rash due to malnutrition and avoidance of essential nutrients      TREATMENT  Treatments are aimed at minimizing exposure to irritating factors and decreasing  the skin inflammation which results in an itchy rash  There are many different treatment options, which depend on your child's rash, its location, and severity  Topical treatments include corticosteroids and steroid-like creams such as Protopic, Elidel, and Eucrisa, which are believed to not thin the skin  Please read the discussions below regarding risks and benefits of all of these creams  Occasionally bacterial or viral infections can occur which flare the skin and require oral and/or topical antibiotics or antivirals  In some cases bleach baths 2-3 times weekly can be helpful to prevent recurrent infection  For severe disease, strong oral medications such as corticosteroids, methotrexate or azathioprine (Imuran) may be needed  These medications require close monitoring and follow-up  You should discuss the risks/ benefits/alternatives of these medications with your health care provider to come up with the best treatment plan for your child  1) Use moisturizer all over the entire body at least THREE TIMES a day  This keeps the skin moisturized to restore the barrier function  Find a cream or ointment that your child likes - this is the most important  The medicines do not work in the bottle  The thicker the moisturizer, generally the better barrier it provides  Ointments often moisturize better than creams; and creams work better than lotions  Lotions are more useful during the summer when thick greasy ointments are uncomfortable  If you put moisturizer on the skin after bathing, while the skin is damp, it is twice as effective  The moisturizer provides a seal holding the water in the skin  You may bathe your child in warm - not hot - water, for short periods of time (no more than 5-10 minutes at a time) once a day if they like    Lightly pat your child dry with a towel and, while the skin is still damp, (within 3 minutes) apply a moisturizer from head to toe  If your child is using a medicated cream, apply it and allow it to absorb completely BEFORE you apply the moisturizer  2) Apply the prescription medication TWICE A DAY to only the red, rough areas on the skin OR AS [place]  Put the medication on your fingers and gently rub it into the areas  Usually the medicine will help an area within a few days time  Try to put the medicine on for two days after you have noticed that the redness is no longer present; this will help the redness from returning  The severity of the rash and the strength and usage of the medication will determine how quickly you see improvement  It is important that you do not overuse steroid creams, and if you notice a thin, shiny appearance to the skin or broken blood vessels, you should stop using the cream and consult your health care provider regarding possible overuse/overthinning of the skin  The face, armpits and groin have particularly thin and sensitive skin and are therefore most at risk for bad results if steroids are over-used in these sites  3) Avoid triggers  Some children have specific things that trigger itching and rashes, while others may have none that can be identified  It may require a little bit of trial and error to see what applies to your child  Also, triggers can change over time for your child  The most common triggers are listed above; start with these  Avoid the use of fabric softeners in the washing machine or dryer sheets (unless they are fragrance-free)  Try to use laundry detergents, soaps and shampoos that are fragrance-free  You may find it helpful to double-rinse your clothes  Some children are sensitive to house dust mites and they may benefit from a plastic mattress wrap  While food allergy is more common in children with eczema, foods are specific triggers for flares in only a small percentage of children    If you notice that the skin flares after certain foods you can see if eliminating one food at time makes a difference, as long as your child can still enjoy a well-balanced diet  4) Consider using a medication like an anti-histamine by mouth to help control the itching  Scratching only makes the skin more reactive and the barrier function even more disrupted  It can cause both children and their parents to lose sleep! There are different types of anti-itch medications  Some cause more drowsiness than others  Both types are acceptable depending on your child and your preference  Start with Benadryl and if that does not work, ask for a prescription antihistamine      5) About the prescription creams:  Corticosteroid creams and ointments (generally things with "-one" or "hao" on the end of their names): The strength of the cream or ointment depends on the name of the active ingredient  The numbers at the end do not indicate the relative strength  Thus triamcinolone 0 1% ointment, considered a mid-strength corticosteroid, is much stronger than hydrocortisone 1% even though the number following the name is much lower  Topical corticosteroids are very effective in treating atopic dermatitis  When used in the manner prescribed (to rashy areas of skin and for no more than a few weeks at a time to any one area) they are very safe  These are corticosteroids and are anti-inflammatory, not the anabolic steroids like those used illegally by some athletes  Topical non-steroid creams and ointments (immunomodulators): These creams and ointments are also called topical calcineurin inhibitors (TCIs)  These include Protopic ointment and Elidel cream  Crisaborole 2% Verla Hemp) is a prescription ointment that targets an enzyme called PDE4 (phosphodiesterase 4)  It is used on the skin topically to treat mild-to-moderate eczema in adults and children 3years of age and older    In total, these nonsteroidal prescriptions are used to help decrease itching and redness in the skin  They are not as strong as most steroid creams; however, it is believed that they do not thin the skin when overused  They are generally used as second-line medications, though they may be used alone or in conjunction with topical steroids  In sensitive areas such as the face, underarms or groin, they are often recommended  They can sting inflamed skin, but are generally well tolerated once the skin is healing  The FDA placed a black-box warning on both Elidel and Protopic in 2006 based on animal studies using the medications  Some animals developed skin cancer and lymphoma  Subsequently, the FDA released a statement that there is no causal relationship between the two medications and cancer  Because of this concern, there are ongoing studies to evaluate this relationship in humans  So far, there are studies that support the safety of these medications  One showed that the rates of cancer in patient using these medications topically were less than the rates of the general population and another showed that in patient's using the medication over a large area of the body, the levels of the medication in the blood was undetectable  As for Eucrisa, this product is only approved for the topical treatment of mild-to-moderate eczema in patients 3years of age and older; use of the medication in kids younger than 2 is considered off label and has not been formally studied  Burning and stinging are the most commonly reported side effects of this medication  Rarely, this product has been known to cause hives and hypersensitivity reactions; discontinue its use if you develop severe itching, swelling, or redness in the area of application  Suture removal    Date/Time: 6/17/2021 2:38 PM  Performed by: Pati Leyva MA  Authorized by: Savita Hernandez MD   Universal Protocol:  Procedure performed by:  Consent: Verbal consent obtained    Risks and benefits: risks, benefits and alternatives were discussed  Consent given by: patient  Time out: Immediately prior to procedure a "time out" was called to verify the correct patient, procedure, equipment, support staff and site/side marked as required  Timeout called at: 6/17/2021 2:38 PM   Patient understanding: patient states understanding of the procedure being performed  Patient consent: the patient's understanding of the procedure matches consent given  Procedure consent: procedure consent matches procedure scheduled  Patient identity confirmed: verbally with patient        Patient location:  Other (comment)  Location:     Laterality:  Left    Location:  Upper extremity    Upper extremity location:  Arm  Procedure details: Tools used:  Suture removal kit    Wound appearance:  No sign(s) of infection, good wound healing and clean    Number of sutures removed:  1  Post-procedure details:     Post-removal:  No dressing applied    Patient tolerance of procedure:   Tolerated well, no immediate complications      Scribe Attestation    I,:  Kelly Ray MA am acting as a scribe while in the presence of the attending physician :       I,:  Shamir Hare MD personally performed the services described in this documentation    as scribed in my presence :

## 2021-06-18 ENCOUNTER — TELEPHONE (OUTPATIENT)
Dept: DERMATOLOGY | Facility: CLINIC | Age: 76
End: 2021-06-18

## 2021-06-18 DIAGNOSIS — L20.89 OTHER ATOPIC DERMATITIS: Primary | ICD-10-CM

## 2021-06-18 NOTE — TELEPHONE ENCOUNTER
Tried calling patient to verify drug coverage but cards on file does not state drug coverage, unable to leave a vm 2 calls were made      If patient calls back please obtain drug covered so I can start dupixent prior authorization

## 2021-06-25 NOTE — TELEPHONE ENCOUNTER
Fri 6/25/2021 10:11 AM    Patient LM asking for an update on Dupixent Prior Authorization  Phone number given: [phone]

## 2021-06-28 NOTE — TELEPHONE ENCOUNTER
Fri 6/25/2021 4:24 PM  Patient LM stating the doctor was going to contact the insurance company about [address] and would like an update

## 2021-07-01 NOTE — TELEPHONE ENCOUNTER
Pt has Medicare and [employer] (which I believe is her medicare supplement), pt states she has no other cards and that that is her prescription information  Pt watches grandchildren in AM and if you need to contact pls call cell [phone] then home  (pt had answering machine off by accident, has fixed that)  Please contact pt if you need anything, otherwise, pt states that she has no other coverage  Thank you!

## 2021-07-08 NOTE — TELEPHONE ENCOUNTER
Was able to locate drug coverage for patient she is under  [employer] ; I have submitted a prior auth through cover my meds however, I am missing script on file  Can you please order Dupixent    Thank you - Elham Block

## 2021-07-08 NOTE — TELEPHONE ENCOUNTER
An order for intitial 600mg dose and 300mg every other week was now written  [facility] [place]  I susptect it needs to go to [facility]

## 2021-07-08 NOTE — TELEPHONE ENCOUNTER
Spoke with patient in regards to going her an update made her aware if denied we can do [address]   I will keep her posted/

## 2021-07-08 NOTE — TELEPHONE ENCOUNTER
Yes once approved I will find out where it goes because her drug coverage is from [address] [place] [address]  believe  Ill keep you posted

## 2021-07-14 NOTE — TELEPHONE ENCOUNTER
Wed 7/14/2021 11:04 AM  Daughter, Joie Ruiz on behalf of her mother  No other details given    Contact #: [phone]

## 2021-07-15 NOTE — TELEPHONE ENCOUNTER
Caller [employer]; to check status of prior auth since its been 5 business days and no response;   Spoke to representative who states that since 06/30/2017 patient has not been active; [phone]  Reference # [id number]

## 2021-07-15 NOTE — TELEPHONE ENCOUNTER
Patient called and is upset and would like an update on her [address]  Please call patient back ASAP at [phone]

## 2021-07-19 NOTE — TELEPHONE ENCOUNTER
Sharita Munoz from [facility] called regarding the started dose of the dupixent  They are getting a denial for the quantity  Sharita Munoz is asking for a call back at [phone] ext [phone]  She stated a PA may need to be done for this quantity

## 2021-07-21 NOTE — TELEPHONE ENCOUNTER
Patient has been approved for Dupixent, I wanted to get clarification of started and maintenance dose

## 2021-07-23 ENCOUNTER — TELEPHONE (OUTPATIENT)
Dept: DERMATOLOGY | Facility: CLINIC | Age: 76
End: 2021-07-23

## 2021-07-23 DIAGNOSIS — L20.89 OTHER ATOPIC DERMATITIS: ICD-10-CM

## 2021-07-23 RX ORDER — DUPILUMAB 300 MG/2ML
INJECTION, SOLUTION SUBCUTANEOUS
Qty: 13 ML | Refills: 3 | Status: SHIPPED | OUTPATIENT
Start: 2021-07-23 | End: 2022-07-23

## 2021-07-27 ENCOUNTER — OFFICE VISIT (OUTPATIENT)
Dept: DERMATOLOGY | Facility: CLINIC | Age: 76
End: 2021-07-27
Payer: MEDICARE

## 2021-07-27 DIAGNOSIS — L20.9 ATOPIC DERMATITIS, UNSPECIFIED TYPE: Primary | ICD-10-CM

## 2021-07-27 PROCEDURE — 96372 THER/PROPH/DIAG INJ SC/IM: CPT | Performed by: DERMATOLOGY

## 2021-07-27 NOTE — PROGRESS NOTES
Biologic Injectable Administration Note  Diagnosis: atopic dermatitis   This is injection number 1    Informed consent: Discussed risks (Risks of hypersensitivity reaction, injection site reaction, conjunctivitis/keratitis, HSV reactivation, increased susceptibility to parasitic infections, inefficacy were reviewed ) Verbal consent obtained  Preparation: After discussion potential procedure related risks including pain, bleeding, new infection, reactivation of latent infection, inefficacy, increased risk of malignancy, hypersensitivity reaction, injection site reaction, verbal consent was obtained  The areas were cleansed with alcohol prep pads and allowed to fully air dry for 3 minutes  Procedure Details:  300 was injected subcutaneously in the bilateral abdomen  Lot Number: [id number]  Expiration: 08/2023  Total Injected: 600       Patient tolerated procedure well, with minimal pinpoint bleeding that was controlled with pressure  Aftercare was reviewed

## 2021-07-29 NOTE — TELEPHONE ENCOUNTER
Yes Hardik Howard Can you place order  It would be 300mg  SQ autoinjector     every other week    Do thye want 3 months

## 2021-07-29 NOTE — TELEPHONE ENCOUNTER
Patient called to see if it's possible that we can call [facility] specialty pharmacy to set up an automatic prescription renewal instead of her having to call every month to get Dupixent medication  Patient states she tried setting it up herself but pharmacy requires provider to give them the ok  Thank you

## 2021-08-11 NOTE — TELEPHONE ENCOUNTER
Called [facility] spoke with representative who stated patient has not called to set this up, since this is not an option due to medication requiring refrigeration  They can not risk medication being shipped and no one is home due to medication expense

## 2021-09-16 ENCOUNTER — TELEPHONE (OUTPATIENT)
Dept: DERMATOLOGY | Facility: CLINIC | Age: 76
End: 2021-09-16

## 2021-09-16 NOTE — TELEPHONE ENCOUNTER
Pt is calling to thank you for the dupixent injection has completed 3 shots so far  States 80% better with itching and the sores seem to be turning white and are becoming less/healing and going away  Pt is calling to she what her f/u should be - 6 month to check on her progress ? Please advise  Thank you!

## 2021-09-20 DIAGNOSIS — L20.9 ATOPIC DERMATITIS, UNSPECIFIED TYPE: ICD-10-CM

## 2021-09-20 RX ORDER — CLOBETASOL PROPIONATE 0.5 MG/G
OINTMENT TOPICAL
Qty: 60 G | Refills: 0 | Status: SHIPPED | OUTPATIENT
Start: 2021-09-20

## 2022-01-04 ENCOUNTER — TELEPHONE (OUTPATIENT)
Dept: DERMATOLOGY | Facility: CLINIC | Age: 77
End: 2022-01-04

## 2022-01-04 NOTE — TELEPHONE ENCOUNTER
vm received from patient   Patient wanted to clarify time she was scheduled for on 1/13/22    Returned call; informed her it is for 1:25pm

## 2022-01-13 ENCOUNTER — OFFICE VISIT (OUTPATIENT)
Dept: DERMATOLOGY | Facility: CLINIC | Age: 77
End: 2022-01-13
Payer: MEDICARE

## 2022-01-13 VITALS — HEIGHT: 62 IN | WEIGHT: 189 LBS | TEMPERATURE: 97.9 F | BODY MASS INDEX: 34.78 KG/M2

## 2022-01-13 DIAGNOSIS — L20.9 ATOPIC DERMATITIS, UNSPECIFIED TYPE: Primary | ICD-10-CM

## 2022-01-13 PROCEDURE — 99213 OFFICE O/P EST LOW 20 MIN: CPT | Performed by: DERMATOLOGY

## 2022-01-13 RX ORDER — ASPIRIN 81 MG/1
81 TABLET, CHEWABLE ORAL DAILY
COMMUNITY

## 2022-01-13 NOTE — PATIENT INSTRUCTIONS
Assessment and Plan:  Based on a thorough discussion of this condition and the management approach to it (including a comprehensive discussion of the known risks, side effects and potential benefits of treatment), the patient (family) agrees to implement the following specific plan:   When at home try wrapping your arms with a stretchy gauze/wrap    Wear knee high compression socks    Continue with the Dupixent    If eyes are ever dry use a moisturizer eye drop      Assessment and Plan:   Atopic Dermatitis is a chronic, itchy skin condition that is very common in children but may occur at any age  It is also known as eczema or atopic eczema   It is the most common form of dermatitis  Atopic dermatitis usually occurs in people who have an atopic tendency    This means they may develop any or all of these closely linked conditions: Atopic dermatitis, asthma, hay fever (allergic rhinitis), eosinophilic esophagitis, and gastroenteritis  Often these conditions run within families with a parent, child or sibling also affected  A family history of asthma, eczema or hay fever is particularly useful in diagnosing atopic dermatitis in infants  Atopic dermatitis arises because of a complex interaction of genetic and environmental factors  These include defects in skin barrier function making the skin more susceptible to irritation by soap and other contact irritants, the weather, temperature and non-specific triggers  There is also an element of immune system dysregulation that is often present  By definition, it is chronic and has a "waxing-waning" nature; flares should be expected but with good education and treatment strategies can be minimized  Some specific tips we discussed:   Dry skin care     Usingonly mild cleansers (hypoallergenic and without fragrances) and fragrance free detergent (not unscented products which contain a masking agent); we discussed avoiding irritants/fragranced products   The importance of regular application of moisturizers daily (at least 3 times a day)   The known and theoretical side effects of steroids at length, including but not limited to atrophy of skin and increased pressure in eye (glaucoma) and clouding of the eye's lens (cataracts) if used in or around the eye for extended durations   The specific over-the-counter interventions and medications   Side effects, risks and benefits of topical and oral medications discussed

## 2022-01-13 NOTE — PROGRESS NOTES
[facility] Follow Up Note    Patient Name: Christina Velarde  Encounter Date: 01/13/2021    Today's Chief Concerns:  Tricia Rose Concern #1:  6 month full body exam     Current Medications:    Current Outpatient Medications:     aspirin 81 mg chewable tablet, Chew 81 mg daily, Disp: , Rfl:     Breo Ellipta 200-25 MCG/INH inhaler, TAKE 1 PUFF BY MOUTH DAILY, Disp: , Rfl:     budesonide-formoterol (Symbicort) 160-4 5 mcg/act inhaler, TAKE 2 PUFFS BY MOUTH TWICE A DAY, Disp: , Rfl:     clobetasol (TEMOVATE) 0 05 % ointment, APPLY TO AFFECTED AREA TWICE A DAY, Disp: 60 g, Rfl: 0    cyclobenzaprine (FLEXERIL) 5 mg tablet, Take 5 mg by mouth 3 (three) times a day as needed, Disp: , Rfl:     dupilumab (DUPIXENT) subcutaneous injection, Inject 2 mL (300 mg total) under the skin every 14 (fourteen) days, Disp: 12 mL, Rfl: 3    Dupixent subcutaneous injection, INJECT 2 SYRINGES SUBCUTANEOUSLY ON DAY 1, THEN INJECT 1 SYRINGE ON DAY 15 , THEN 1 SYRINGE EVERY OTHER WEEK THEREAFTER , Disp: 13 mL, Rfl: 3    escitalopram (LEXAPRO) 10 mg tablet, Take 10 mg by mouth daily, Disp: , Rfl:     ezetimibe (ZETIA) 10 mg tablet, Take 10 mg by mouth daily, Disp: , Rfl:     fluconazole (DIFLUCAN) 150 mg tablet, TAKE 1 TABLET BY MOUTH AS ONE DOSE, Disp: , Rfl:     glimepiride (AMARYL) 4 mg tablet, Take 4 mg by mouth daily with breakfast, Disp: , Rfl:     hydrochlorothiazide (HYDRODIURIL) 25 mg tablet, Take 25 mg by mouth daily, Disp: , Rfl:     hydrOXYzine HCL (ATARAX) 10 mg tablet, TAKE 1 TABLET BY MOUTH TWICE A DAY AS NEEDED FOR ITCHING (TAKE AT NIGHT BEFORE BED)  , Disp: , Rfl:     ipratropium-albuterol (DUO-NEB) 0 5-2 5 mg/3 mL nebulizer solution, Inhale 3 mL, Disp: , Rfl:     latanoprost (XALATAN) 0 005 % ophthalmic solution, Administer 1 drop to both eyes once at bedtime, Disp: , Rfl:     liraglutide (SAXENDA) injection, Inject 1 8 mg under the skin, Disp: , Rfl:     metoprolol tartrate (LOPRESSOR) 25 mg tablet, , Disp: , Rfl:     tiotropium (Spiriva Respimat) 2 5 MCG/ACT AERS inhaler, Inhale 2 puffs, Disp: , Rfl:     umeclidinium bromide (Incruse Ellipta) 62 5 mcg/inh AEPB inhaler, every 24 hours  , Disp: , Rfl:     ferrous sulfate 325 (65 Fe) mg tablet, Take 325 mg by mouth, Disp: , Rfl:     CONSTITUTIONAL:   Vitals:    01/13/22 1257   Temp: 97 9 °F (36 6 °C)   TempSrc: Temporal   Weight: 85 7 kg (189 lb)   Height: 5' 2" (1 575 m)         Specific Alerts:    Have you been seen by a [facility] Dermatologist in the last 3 years? YES    Are you pregnant or planning to become pregnant? No    Are you currently or planning to be nursing or breast feeding? No    Allergies   Allergen Reactions    Penicillins Anaphylaxis and Rash    Irbesartan Angioedema    Niacin Rash    Statins Rash       May we call your Preferred Phone number to discuss your specific medical information? YES    May we leave a detailed message that includes your specific medical information? YES    Have you traveled outside of the [facility] in the past 3 months? No    Do you currently have a pacemaker or defibrillator? No    Do you have any artificial heart valves, joints, plates, screws, rods, stents, pins, etc? YES   - If Yes, were any placed within the last 2 years? No  Do you require any medications prior to a surgical procedure? YES   - If Yes, for which procedure? unsure   - If Yes, what medications to you require? unsure    Are you taking any medications that cause you to bleed more easily ("blood thinners") YES    Have you ever experienced a rapid heartbeat with epinephrine? No    Have you ever been treated with "gold" (gold sodium thiomalate) therapy? No    [facility] can help with wrinkles, "laugh lines," facial volume loss, "double chin," "love handles," age spots, and more  Are you interested in learning today about some of the skin enhancement procedures that we offer?  (If Yes, please provide more detail) No    Review of Systems:  Have you recently had or currently have any of the following? · Fever or chills: No  · Night Sweats: No  · Headaches: No  · Weight Gain: No  · Weight Loss: No  · Blurry Vision: No  · Nausea: No  · Vomiting: No  · Diarrhea: No  · Blood in Stool: No  · Abdominal Pain: No  · Itchy Skin: No  · Painful Joints: No  · Swollen Joints: No  · Muscle Pain: No  · Irregular Mole: No  · Sun Burn: No  · Dry Skin: No  · Skin Color Changes: No  · Scar or Keloid: No  · Cold Sores/Fever Blisters: No  · Bacterial Infections/MRSA: No  · Anxiety: YES  · Depression: No  · Suicidal or Homicidal Thoughts: No      PSYCH: Normal mood and affect  EYES: Normal conjunctiva  ENT: Normal lips and oral mucosa  CARDIOVASCULAR: No edema  RESPIRATORY: Normal respirations  HEME/LYMPH/IMMUNO:  No regional lymphadenopathy except as noted below in ASSESSMENT AND PLAN BY DIAGNOSIS    FULL ORGAN SYSTEM SKIN EXAM (SKIN)   Hair, Scalp, Ears, Face Normal except as noted below in Assessment   Neck, Cervical Chain Nodes Normal except as noted below in Assessment   Right Arm/Hand/Fingers Normal except as noted below in Assessment   Left Arm/Hand/Fingers Normal except as noted below in Assessment   Chest/Breasts/Axillae Viewed areas Normal except as noted below in Assessment   Abdomen, Umbilicus Normal except as noted below in Assessment   Back/Spine Normal except as noted below in Assessment   Groin/Genitalia/Buttocks Viewed areas Normal except as noted below in Assessment   Right Leg, Foot, Toes Normal except as noted below in Assessment   Left Leg, Foot, Toes Normal except as noted below in Assessment       1   ATOPIC DERMATITIS of unknown etiology     Physical Exam:   Anatomic Location Affected:  Arms, bilateral legs and torso   Morphological Description:  Few indurated nodules and plaques erosions from trauma    Severity: severe   Pertinent Positives:   Pertinent Negatives:    Assessment and Plan:  Based on a thorough discussion of this condition and the management approach to it (including a comprehensive discussion of the known risks, side effects and potential benefits of treatment), the patient (family) agrees to implement the following specific plan:   When at home try wrapping your arms with a gauze/wrap as protective barrier    Wear knee high compression socks    Continue on Dupixent      Assessment and Plan:   Atopic Dermatitis is a chronic, itchy skin condition that is very common in children but may occur at any age  It is also known as eczema or atopic eczema   It is the most common form of dermatitis  Atopic dermatitis usually occurs in people who have an atopic tendency    This means they may develop any or all of these closely linked conditions: Atopic dermatitis, asthma, hay fever (allergic rhinitis), eosinophilic esophagitis, and gastroenteritis  Often these conditions run within families with a parent, child or sibling also affected  A family history of asthma, eczema or hay fever is particularly useful in diagnosing atopic dermatitis in infants  Atopic dermatitis arises because of a complex interaction of genetic and environmental factors  These include defects in skin barrier function making the skin more susceptible to irritation by soap and other contact irritants, the weather, temperature and non-specific triggers  There is also an element of immune system dysregulation that is often present  By definition, it is chronic and has a "waxing-waning" nature; flares should be expected but with good education and treatment strategies can be minimized  Some specific tips we discussed:   Dry skin care   Usingonly mild cleansers (hypoallergenic and without fragrances) and fragrance free detergent (not unscented products which contain a masking agent); we discussed avoiding irritants/fragranced products     The importance of regular application of moisturizers daily (at least 3 times a day)   The known and theoretical side effects of steroids at length, including but not limited to atrophy of skin and increased pressure in eye (glaucoma) and clouding of the eye's lens (cataracts) if used in or around the eye for extended durations   The specific over-the-counter interventions and medications   Side effects, risks and benefits of topical and oral medications discussed    Scribe Attestation    I,:  Melia Walsh MA am acting as a scribe while in the presence of the attending physician :       I,:  Omega Lefort, MD personally performed the services described in this documentation    as scribed in my presence :         India Godfrey, DO

## 2022-01-14 ENCOUNTER — TELEPHONE (OUTPATIENT)
Dept: DERMATOLOGY | Facility: CLINIC | Age: 77
End: 2022-01-14

## 2022-01-28 NOTE — TELEPHONE ENCOUNTER
Called insurance and they stated patients dupixent was approved from 1/20/22-1/20/23  Anahi# [id number]

## 2022-03-09 DIAGNOSIS — L20.9 ATOPIC DERMATITIS, UNSPECIFIED TYPE: ICD-10-CM

## 2022-03-09 DIAGNOSIS — L20.81 ATOPIC NEURODERMATITIS: ICD-10-CM

## 2022-03-15 PROCEDURE — NC001 PR NO CHARGE: Performed by: DERMATOLOGY

## 2022-03-15 NOTE — PROGRESS NOTES
Patient's dupixent was approved for following year  I called specialty pharmacy [facility] and confirmed medication with them  300mg pens every 14 days  Patient has refills for full year

## 2022-04-18 DIAGNOSIS — L20.81 ATOPIC NEURODERMATITIS: ICD-10-CM

## 2022-04-18 DIAGNOSIS — L20.9 ATOPIC DERMATITIS, UNSPECIFIED TYPE: ICD-10-CM

## 2022-04-18 NOTE — TELEPHONE ENCOUNTER
Pt requesting refills of her dupixent to be sent to the [facility]  Fax # [phone]

## 2022-04-19 ENCOUNTER — TELEPHONE (OUTPATIENT)
Dept: DERMATOLOGY | Facility: CLINIC | Age: 77
End: 2022-04-19

## 2022-04-19 NOTE — TELEPHONE ENCOUNTER
Would we be able to add a few more refills onto this prescription?  I wasn't sure how many you wanted to refill

## 2022-06-15 ENCOUNTER — TELEPHONE (OUTPATIENT)
Dept: DERMATOLOGY | Age: 77
End: 2022-06-15

## 2022-06-15 NOTE — TELEPHONE ENCOUNTER
Patient notes that last injection with dupixent was defective  In noted that additional  Dose should be ok and is within safety threshold

## 2022-06-15 NOTE — TELEPHONE ENCOUNTER
Patient called stating that when doing her last Dupixent injection, the pen did not seem to work correctly and she did not get the full dose  She states that she called dupixent to let them know the situation and they are sending her an additional pen  She is inquiring whether or not she should do an additional dose or not

## 2022-09-13 ENCOUNTER — OFFICE VISIT (OUTPATIENT)
Dept: DERMATOLOGY | Facility: CLINIC | Age: 77
End: 2022-09-13
Payer: MEDICARE

## 2022-09-13 VITALS — WEIGHT: 189 LBS | BODY MASS INDEX: 34.78 KG/M2 | HEIGHT: 62 IN | TEMPERATURE: 97.8 F

## 2022-09-13 DIAGNOSIS — L20.84 INTRINSIC ATOPIC DERMATITIS: Primary | ICD-10-CM

## 2022-09-13 DIAGNOSIS — L28.1 PRURIGO NODULARIS: ICD-10-CM

## 2022-09-13 PROCEDURE — 99214 OFFICE O/P EST MOD 30 MIN: CPT | Performed by: DERMATOLOGY

## 2022-09-13 NOTE — PATIENT INSTRUCTIONS
ATOPIC DERMATITIS ("childhood Eczema")    Assessment and Plan:  Based on a thorough discussion of this condition and the management approach to it (including a comprehensive discussion of the known risks, side effects and potential benefits of treatment), the patient (family) agrees to implement the following specific plan:  Continue with Dupixent injection treatment  Next visit in April for yearly renewal of [address]  Continue to wear knee high compression socks     Assessment and Plan:   Atopic Dermatitis is a chronic, itchy skin condition that is very common in children but may occur at any age  It is also known as eczema or atopic eczema   It is the most common form of dermatitis  Atopic dermatitis usually occurs in people who have an atopic tendency    This means they may develop any or all of these closely linked conditions: Atopic dermatitis, asthma, hay fever (allergic rhinitis), eosinophilic esophagitis, and gastroenteritis  Often these conditions run within families with a parent, child or sibling also affected  A family history of asthma, eczema or hay fever is particularly useful in diagnosing atopic dermatitis in infants  Atopic dermatitis arises because of a complex interaction of genetic and environmental factors  These include defects in skin barrier function making the skin more susceptible to irritation by soap and other contact irritants, the weather, temperature and non-specific triggers  There is also an element of immune system dysregulation that is often present  By definition, it is chronic and has a "waxing-waning" nature; flares should be expected but with good education and treatment strategies can be minimized  Some specific tips we discussed:  Dry skin care  Using only mild cleansers (hypoallergenic and without fragrances) and fragrance free detergent (not unscented products which contain a masking agent); we discussed avoiding irritants/fragranced products    The importance of regular application of moisturizers daily (at least 3 times a day)  The known and theoretical side effects of steroids at length, including but not limited to atrophy of skin and increased pressure in eye (glaucoma) and clouding of the eye's lens (cataracts) if used in or around the eye for extended durations  The specific over-the-counter interventions and medications  Side effects, risks and benefits of topical and oral medications discussed  After lengthy discussion of etiology and treatment options, we decided to implement the following personalized treatment plan:      EDUCATION AS INTERVENTION! WHAT IS ATOPIC DERMATITIS? Atopic dermatitis (also called eczema) is a condition of the skin where the skin is dry, red, and itchy  The main function of the skin is to provide a barrier from the environment and is also the first defense of the immune system  In atopic dermatitis the skin barrier is decreased or disrupted, and the skin is easily irritated  As a result, moisture escapes the skin more easily, and environmental allergens and microbes can enter the skin more easily  Consequently, the skin's immune system is altered  If there are increased allergic type cells in the skin, the skin may become red and hyper-excitable   This leads to itching and a subsequent rash  WHY DO PEOPLE GET ATOPIC DERMATITIS? There is no single answer because many factors are involved  It is likely a combination of genetic makeup and environmental triggers and/or exposures  Excessive drying or sweating of the skin, Irritating soaps, dust mites, and pet dander are some of the more common triggers  There is no blood test that can be done to confirm this diagnosis  The history and appearance of the skin is usually sufficient for a diagnosis  However, in some cases if the rash does not fit with the history or respond appropriately to treatment, a skin biopsy may be helpful    Many children do outgrow atopic dermatitis or get better; however, many continue to have sensitive skin into adulthood  Asthma and hay fever are often seen in many patients with atopic dermatitis; however, asthma flares do not necessarily occur at the same time as skin flares  PREVENTING FLARES OF ATOPIC DERMATITIS  The first step is to maintain the skin's barrier function  Keep the skin well moisturized  Avoid irritants and triggers  Use prescribed medicine when there are red or rough areas to help the skin to return to normal as quickly as possible  Try to limit scratching  If you keep the skin well moisturized, and avoid coming in contact with things you know irritate your child's skin, there will be less flares  However, some flares of atopic dermatitis are beyond your control  You should work with your health care provider to come up with a plan that minimizes flares while minimizing long term use of medications that suppress the immune system  WHAT ARE SOME OF THE TRIGGERS? Triggers are different for different people  The most common triggers are:  Heat and sweat for some individuals, cold weather for others  House dust mites, pet fur  Wool; synthetic fabrics like nylon; dyed fabrics  Tobacco smoke   Fragrances in: shampoos, soaps, lotions, laundry detergents, fabric softeners  Saliva or prolonged exposure to water  WHAT ABOUT FOOD ALLERGIES? This is a very controversial topic, as many believe that food allergies are responsible for skin flares  In some cases, specific foods may cause worsening of atopic dermatitis; however this occurs in a minority of cases and usually happens within a few hours of ingestion  While food allergy is more common in children with eczema, foods are specific triggers for flares in only a small percentage of children    If you notice that the skin flares after certain foods you can see if eliminating one food at time makes a difference, as long as your child can still enjoy a well-balanced diet  There are blood (RAST) and skin (PRICK) tests that can check for allergies, but they are often positive in children who are not truly allergic  Therefore it is important that you work with your allergist and dermatologist to determine which foods are relevant and causing true symptoms  Extreme food elimination diets without the guidance of your doctor, which have become more popular in recent years, may even result in worsening of the skin rash due to malnutrition and avoidance of essential nutrients  TREATMENT  Treatments are aimed at minimizing exposure to irritating factors and decreasing  the skin inflammation which results in an itchy rash  There are many different treatment options, which depend on your child's rash, its location, and severity  Topical treatments include corticosteroids and steroid-like creams such as Protopic, Elidel, and Eucrisa, which are believed to not thin the skin  Please read the discussions below regarding risks and benefits of all of these creams  Occasionally bacterial or viral infections can occur which flare the skin and require oral and/or topical antibiotics or antivirals  In some cases bleach baths 2-3 times weekly can be helpful to prevent recurrent infection  For severe disease, strong oral medications such as corticosteroids, methotrexate or azathioprine (Imuran) may be needed  These medications require close monitoring and follow-up  You should discuss the risks/ benefits/alternatives of these medications with your health care provider to come up with the best treatment plan for your child  1) Use moisturizer all over the entire body at least THREE TIMES a day  This keeps the skin moisturized to restore the barrier function  Find a cream or ointment that your child likes - this is the most important  The medicines do not work in the bottle  The thicker the moisturizer, generally the better barrier it provides    Ointments often moisturize better than creams; and creams work better than lotions  Lotions are more useful during the summer when thick greasy ointments are uncomfortable  If you put moisturizer on the skin after bathing, while the skin is damp, it is twice as effective  The moisturizer provides a seal holding the water in the skin  You may bathe your child in warm - not hot - water, for short periods of time (no more than 5-10 minutes at a time) once a day if they like  Lightly pat your child dry with a towel and, while the skin is still damp, (within 3 minutes) apply a moisturizer from head to toe  If your child is using a medicated cream, apply it and allow it to absorb completely BEFORE you apply the moisturizer  2) Apply the prescription medication TWICE A DAY to only the red, rough areas on the skin OR AS [place]  Put the medication on your fingers and gently rub it into the areas  Usually the medicine will help an area within a few days time  Try to put the medicine on for two days after you have noticed that the redness is no longer present; this will help the redness from returning  The severity of the rash and the strength and usage of the medication will determine how quickly you see improvement  It is important that you do not overuse steroid creams, and if you notice a thin, shiny appearance to the skin or broken blood vessels, you should stop using the cream and consult your health care provider regarding possible overuse/overthinning of the skin  The face, armpits and groin have particularly thin and sensitive skin and are therefore most at risk for bad results if steroids are over-used in these sites  3) Avoid triggers  Some children have specific things that trigger itching and rashes, while others may have none that can be identified  It may require a little bit of trial and error to see what applies to your child    Also, triggers can change over time for your child  The most common triggers are listed above; start with these  Avoid the use of fabric softeners in the washing machine or dryer sheets (unless they are fragrance-free)  Try to use laundry detergents, soaps and shampoos that are fragrance-free  You may find it helpful to double-rinse your clothes  Some children are sensitive to house dust mites and they may benefit from a plastic mattress wrap  While food allergy is more common in children with eczema, foods are specific triggers for flares in only a small percentage of children  If you notice that the skin flares after certain foods you can see if eliminating one food at time makes a difference, as long as your child can still enjoy a well-balanced diet  4) Consider using a medication like an anti-histamine by mouth to help control the itching  Scratching only makes the skin more reactive and the barrier function even more disrupted  It can cause both children and their parents to lose sleep! There are different types of anti-itch medications  Some cause more drowsiness than others  Both types are acceptable depending on your child and your preference  Start with Benadryl and if that does not work, ask for a prescription antihistamine      5) About the prescription creams:  Corticosteroid creams and ointments (generally things with "-one" or "hao" on the end of their names): The strength of the cream or ointment depends on the name of the active ingredient  The numbers at the end do not indicate the relative strength  Thus triamcinolone 0 1% ointment, considered a mid-strength corticosteroid, is much stronger than hydrocortisone 1% even though the number following the name is much lower  Topical corticosteroids are very effective in treating atopic dermatitis  When used in the manner prescribed (to rashy areas of skin and for no more than a few weeks at a time to any one area) they are very safe    These are corticosteroids and are anti-inflammatory, not the anabolic steroids like those used illegally by some athletes  Topical non-steroid creams and ointments (immunomodulators): These creams and ointments are also called topical calcineurin inhibitors (TCIs)  These include Protopic ointment and Elidel cream  Crisaborole 2% Niki Avila) is a prescription ointment that targets an enzyme called PDE4 (phosphodiesterase 4)  It is used on the skin topically to treat mild-to-moderate eczema in adults and children 3years of age and older  In total, these nonsteroidal prescriptions are used to help decrease itching and redness in the skin  They are not as strong as most steroid creams; however, it is believed that they do not thin the skin when overused  They are generally used as second-line medications, though they may be used alone or in conjunction with topical steroids  In sensitive areas such as the face, underarms or groin, they are often recommended  They can sting inflamed skin, but are generally well tolerated once the skin is healing  The FDA placed a black-box warning on both Elidel and Protopic in 2006 based on animal studies using the medications  Some animals developed skin cancer and lymphoma  Subsequently, the FDA released a statement that there is no causal relationship between the two medications and cancer  Because of this concern, there are ongoing studies to evaluate this relationship in humans  So far, there are studies that support the safety of these medications  One showed that the rates of cancer in patient using these medications topically were less than the rates of the general population and another showed that in patient's using the medication over a large area of the body, the levels of the medication in the blood was undetectable      As for Eucrisa, this product is only approved for the topical treatment of mild-to-moderate eczema in patients 3years of age and older; use of the medication in kids younger than 2 is considered off label and has not been formally studied  Burning and stinging are the most commonly reported side effects of this medication  Rarely, this product has been known to cause hives and hypersensitivity reactions; discontinue its use if you develop severe itching, swelling, or redness in the area of application

## 2022-09-13 NOTE — PROGRESS NOTES
[facility] Follow Up Note    Patient Name: Eddie Russo  Encounter Date: 9/13/2022    Today's Chief Concerns:  Concern #1:  Full body skin check  Concern #2:  Follow up on Rash    Current Medications:    Current Outpatient Medications:     aspirin 81 mg chewable tablet, Chew 81 mg daily, Disp: , Rfl:     Breo Ellipta 200-25 MCG/INH inhaler, TAKE 1 PUFF BY MOUTH DAILY, Disp: , Rfl:     budesonide-formoterol (SYMBICORT) 160-4 5 mcg/act inhaler, TAKE 2 PUFFS BY MOUTH TWICE A DAY, Disp: , Rfl:     clobetasol (TEMOVATE) 0 05 % ointment, APPLY TO AFFECTED AREA TWICE A DAY, Disp: 60 g, Rfl: 0    cyclobenzaprine (FLEXERIL) 5 mg tablet, Take 5 mg by mouth 3 (three) times a day as needed, Disp: , Rfl:     dupilumab (DUPIXENT) subcutaneous injection, Inject 2 mL (300 mg total) under the skin every 14 (fourteen) days, Disp: 12 mL, Rfl: 6    escitalopram (LEXAPRO) 10 mg tablet, Take 10 mg by mouth daily, Disp: , Rfl:     ezetimibe (ZETIA) 10 mg tablet, Take 10 mg by mouth daily, Disp: , Rfl:     glimepiride (AMARYL) 4 mg tablet, Take 4 mg by mouth daily with breakfast, Disp: , Rfl:     hydrochlorothiazide (HYDRODIURIL) 25 mg tablet, Take 25 mg by mouth daily, Disp: , Rfl:     hydrOXYzine HCL (ATARAX) 10 mg tablet, TAKE 1 TABLET BY MOUTH TWICE A DAY AS NEEDED FOR ITCHING (TAKE AT NIGHT BEFORE BED)  , Disp: , Rfl:     ipratropium-albuterol (DUO-NEB) 0 5-2 5 mg/3 mL nebulizer solution, Inhale 3 mL, Disp: , Rfl:     latanoprost (XALATAN) 0 005 % ophthalmic solution, Administer 1 drop to both eyes once at bedtime, Disp: , Rfl:     metoprolol tartrate (LOPRESSOR) 25 mg tablet, , Disp: , Rfl:     sitaGLIPtin-metFORMIN (JANUMET)  MG per tablet, Take 1 tablet by mouth 2 (two) times a day with meals, Disp: , Rfl:     ferrous sulfate 325 (65 Fe) mg tablet, Take 325 mg by mouth, Disp: , Rfl:     fluconazole (DIFLUCAN) 150 mg tablet, TAKE 1 TABLET BY MOUTH AS ONE DOSE (Patient not taking: Reported on 9/13/2022), Disp: , Rfl:     liraglutide (SAXENDA) injection, Inject 1 8 mg under the skin (Patient not taking: Reported on 9/13/2022), Disp: , Rfl:     tiotropium (Spiriva Respimat) 2 5 MCG/ACT AERS inhaler, Inhale 2 puffs (Patient not taking: Reported on 9/13/2022), Disp: , Rfl:     umeclidinium bromide (Incruse Ellipta) 62 5 mcg/inh AEPB inhaler, every 24 hours  , Disp: , Rfl:     CONSTITUTIONAL:   Vitals:    09/13/22 1013   Temp: 97 8 °F (36 6 °C)   TempSrc: Temporal   Weight: 85 7 kg (189 lb)   Height: 5' 2" (1 575 m)       Specific Alerts:    Have you been seen by a [facility]ologist in the last 3 years? YES    Are you pregnant or planning to become pregnant? No    Are you currently or planning to be nursing or breast feeding? No    Allergies   Allergen Reactions    Penicillins Anaphylaxis and Rash    Irbesartan Angioedema    Niacin Rash    Statins Rash       May we call your Preferred Phone number to discuss your specific medical information? YES    May we leave a detailed message that includes your specific medical information? YES    Have you traveled outside of the [facility] in the past 3 months? No    Review of Systems:  Have you recently had or currently have any of the following?     Fever or chills: No  Night Sweats: No  Headaches: No  Weight Gain: No  Weight Loss: No  Blurry Vision: No  Nausea: No  Vomiting: No  Diarrhea: No  Blood in Stool: No  Abdominal Pain: No  Itchy Skin: YES  Painful Joints: No  Swollen Joints: No  Muscle Pain: YES  Irregular Mole: No  Sun Burn: No  Dry Skin: YES  Skin Color Changes: No  Scar or Keloid: No  Cold Sores/Fever Blisters: No  Bacterial Infections/MRSA: No  Anxiety: No  Depression: No  Suicidal or Homicidal Thoughts: No      PSYCH: Normal mood and affect  EYES: Normal conjunctiva  ENT: Normal lips and oral mucosa  CARDIOVASCULAR: No edema  RESPIRATORY: Normal respirations  HEME/LYMPH/IMMUNO:  No regional lymphadenopathy except as noted below in [address] [place] SKIN EXAM (SKIN)   Right Arm/Hand/Fingers Normal except as noted below in Assessment   Left Arm/Hand/Fingers Normal except as noted below in Assessment   Right Leg, Foot, Toes Normal except as noted below in Assessment   Left Leg, Foot, Toes Normal except as noted below in Assessment       1  ATOPIC DERMATITIS IN ASSOCIATION WITH PRURIGO NODULARIS    Physical Exam:  Anatomic Location Affected:  Bilateral arms, legs and torso  Morphological Description:  Few nodules much improved; at least 60-70% clearing with the Dupixent  Overall Severity: moderate    Additional History of Present Condition:  Patient is currently on [address] for atopic dermatitis  Patient states her rash has improved since starting the [address]  Patient still has some nodules that are still healing  Patient states she tries to not pick at them  Patient stated she has noticed she isn't itching her skin as much as she was before being on the dupixent  Assessment and Plan:  Based on a thorough discussion of this condition and the management approach to it (including a comprehensive discussion of the known risks, side effects and potential benefits of treatment), the patient (family) agrees to implement the following specific plan:  Continue with Dupixent injection treatment  Next visit in April for yearly renewal of [address]  Continue to wear knee high compression socks     Assessment and Plan:   Atopic Dermatitis is a chronic, itchy skin condition that is very common in children but may occur at any age  It is also known as eczema or atopic eczema   It is the most common form of dermatitis  Atopic dermatitis usually occurs in people who have an atopic tendency    This means they may develop any or all of these closely linked conditions: Atopic dermatitis, asthma, hay fever (allergic rhinitis), eosinophilic esophagitis, and gastroenteritis    Often these conditions run within families with a parent, child or sibling also affected  A family history of asthma, eczema or hay fever is particularly useful in diagnosing atopic dermatitis in infants  Atopic dermatitis arises because of a complex interaction of genetic and environmental factors  These include defects in skin barrier function making the skin more susceptible to irritation by soap and other contact irritants, the weather, temperature and non-specific triggers  There is also an element of immune system dysregulation that is often present  By definition, it is chronic and has a "waxing-waning" nature; flares should be expected but with good education and treatment strategies can be minimized  Some specific tips we discussed:  Dry skin care  Using only mild cleansers (hypoallergenic and without fragrances) and fragrance free detergent (not unscented products which contain a masking agent); we discussed avoiding irritants/fragranced products  The importance of regular application of moisturizers daily (at least 3 times a day)  The known and theoretical side effects of steroids at length, including but not limited to atrophy of skin and increased pressure in eye (glaucoma) and clouding of the eye's lens (cataracts) if used in or around the eye for extended durations  The specific over-the-counter interventions and medications  Side effects, risks and benefits of topical and oral medications discussed  After lengthy discussion of etiology and treatment options, we decided to implement the following personalized treatment plan:      EDUCATION AS INTERVENTION! WHAT IS ATOPIC DERMATITIS? Atopic dermatitis (also called eczema) is a condition of the skin where the skin is dry, red, and itchy  The main function of the skin is to provide a barrier from the environment and is also the first defense of the immune system      In atopic dermatitis the skin barrier is decreased or disrupted, and the skin is easily irritated  As a result, moisture escapes the skin more easily, and environmental allergens and microbes can enter the skin more easily  Consequently, the skin's immune system is altered  If there are increased allergic type cells in the skin, the skin may become red and hyper-excitable   This leads to itching and a subsequent rash  WHY DO PEOPLE GET ATOPIC DERMATITIS? There is no single answer because many factors are involved  It is likely a combination of genetic makeup and environmental triggers and/or exposures  Excessive drying or sweating of the skin, Irritating soaps, dust mites, and pet dander are some of the more common triggers  There is no blood test that can be done to confirm this diagnosis  The history and appearance of the skin is usually sufficient for a diagnosis  However, in some cases if the rash does not fit with the history or respond appropriately to treatment, a skin biopsy may be helpful  Many children do outgrow atopic dermatitis or get better; however, many continue to have sensitive skin into adulthood  Asthma and hay fever are often seen in many patients with atopic dermatitis; however, asthma flares do not necessarily occur at the same time as skin flares  PREVENTING FLARES OF ATOPIC DERMATITIS  The first step is to maintain the skin's barrier function  Keep the skin well moisturized  Avoid irritants and triggers  Use prescribed medicine when there are red or rough areas to help the skin to return to normal as quickly as possible  Try to limit scratching  If you keep the skin well moisturized, and avoid coming in contact with things you know irritate your child's skin, there will be less flares  However, some flares of atopic dermatitis are beyond your control  You should work with your health care provider to come up with a plan that minimizes flares while minimizing long term use of medications that suppress the immune system          WHAT ARE SOME OF THE TRIGGERS? Triggers are different for different people  The most common triggers are:  Heat and sweat for some individuals, cold weather for others  House dust mites, pet fur  Wool; synthetic fabrics like nylon; dyed fabrics  Tobacco smoke   Fragrances in: shampoos, soaps, lotions, laundry detergents, fabric softeners  Saliva or prolonged exposure to water  WHAT ABOUT FOOD ALLERGIES? This is a very controversial topic, as many believe that food allergies are responsible for skin flares  In some cases, specific foods may cause worsening of atopic dermatitis; however this occurs in a minority of cases and usually happens within a few hours of ingestion  While food allergy is more common in children with eczema, foods are specific triggers for flares in only a small percentage of children  If you notice that the skin flares after certain foods you can see if eliminating one food at time makes a difference, as long as your child can still enjoy a well-balanced diet  There are blood (RAST) and skin (PRICK) tests that can check for allergies, but they are often positive in children who are not truly allergic  Therefore it is important that you work with your allergist and dermatologist to determine which foods are relevant and causing true symptoms  Extreme food elimination diets without the guidance of your doctor, which have become more popular in recent years, may even result in worsening of the skin rash due to malnutrition and avoidance of essential nutrients  TREATMENT  Treatments are aimed at minimizing exposure to irritating factors and decreasing  the skin inflammation which results in an itchy rash  There are many different treatment options, which depend on your child's rash, its location, and severity  Topical treatments include corticosteroids and steroid-like creams such as Protopic, Elidel, and Eucrisa, which are believed to not thin the skin    Please read the discussions below regarding risks and benefits of all of these creams  Occasionally bacterial or viral infections can occur which flare the skin and require oral and/or topical antibiotics or antivirals  In some cases bleach baths 2-3 times weekly can be helpful to prevent recurrent infection  For severe disease, strong oral medications such as corticosteroids, methotrexate or azathioprine (Imuran) may be needed  These medications require close monitoring and follow-up  You should discuss the risks/ benefits/alternatives of these medications with your health care provider to come up with the best treatment plan for your child  1) Use moisturizer all over the entire body at least THREE TIMES a day  This keeps the skin moisturized to restore the barrier function  Find a cream or ointment that your child likes - this is the most important  The medicines do not work in the bottle  The thicker the moisturizer, generally the better barrier it provides  Ointments often moisturize better than creams; and creams work better than lotions  Lotions are more useful during the summer when thick greasy ointments are uncomfortable  If you put moisturizer on the skin after bathing, while the skin is damp, it is twice as effective  The moisturizer provides a seal holding the water in the skin  You may bathe your child in warm - not hot - water, for short periods of time (no more than 5-10 minutes at a time) once a day if they like  Lightly pat your child dry with a towel and, while the skin is still damp, (within 3 minutes) apply a moisturizer from head to toe  If your child is using a medicated cream, apply it and allow it to absorb completely BEFORE you apply the moisturizer  2) Apply the prescription medication TWICE A DAY to only the red, rough areas on the skin OR AS [place]  Put the medication on your fingers and gently rub it into the areas    Usually the medicine will help an area within a few days time   Try to put the medicine on for two days after you have noticed that the redness is no longer present; this will help the redness from returning  The severity of the rash and the strength and usage of the medication will determine how quickly you see improvement  It is important that you do not overuse steroid creams, and if you notice a thin, shiny appearance to the skin or broken blood vessels, you should stop using the cream and consult your health care provider regarding possible overuse/overthinning of the skin  The face, armpits and groin have particularly thin and sensitive skin and are therefore most at risk for bad results if steroids are over-used in these sites  3) Avoid triggers  Some children have specific things that trigger itching and rashes, while others may have none that can be identified  It may require a little bit of trial and error to see what applies to your child  Also, triggers can change over time for your child  The most common triggers are listed above; start with these  Avoid the use of fabric softeners in the washing machine or dryer sheets (unless they are fragrance-free)  Try to use laundry detergents, soaps and shampoos that are fragrance-free  You may find it helpful to double-rinse your clothes  Some children are sensitive to house dust mites and they may benefit from a plastic mattress wrap  While food allergy is more common in children with eczema, foods are specific triggers for flares in only a small percentage of children  If you notice that the skin flares after certain foods you can see if eliminating one food at time makes a difference, as long as your child can still enjoy a well-balanced diet  4) Consider using a medication like an anti-histamine by mouth to help control the itching  Scratching only makes the skin more reactive and the barrier function even more disrupted  It can cause both children and their parents to lose sleep! There are different types of anti-itch medications  Some cause more drowsiness than others  Both types are acceptable depending on your child and your preference  Start with Benadryl and if that does not work, ask for a prescription antihistamine      5) About the prescription creams:  Corticosteroid creams and ointments (generally things with "-one" or "hao" on the end of their names): The strength of the cream or ointment depends on the name of the active ingredient  The numbers at the end do not indicate the relative strength  Thus triamcinolone 0 1% ointment, considered a mid-strength corticosteroid, is much stronger than hydrocortisone 1% even though the number following the name is much lower  Topical corticosteroids are very effective in treating atopic dermatitis  When used in the manner prescribed (to rashy areas of skin and for no more than a few weeks at a time to any one area) they are very safe  These are corticosteroids and are anti-inflammatory, not the anabolic steroids like those used illegally by some athletes  Topical non-steroid creams and ointments (immunomodulators): These creams and ointments are also called topical calcineurin inhibitors (TCIs)  These include Protopic ointment and Elidel cream  Crisaborole 2% Avinash Mnoica) is a prescription ointment that targets an enzyme called PDE4 (phosphodiesterase 4)  It is used on the skin topically to treat mild-to-moderate eczema in adults and children 3years of age and older  In total, these nonsteroidal prescriptions are used to help decrease itching and redness in the skin  They are not as strong as most steroid creams; however, it is believed that they do not thin the skin when overused  They are generally used as second-line medications, though they may be used alone or in conjunction with topical steroids  In sensitive areas such as the face, underarms or groin, they are often recommended    They can sting inflamed skin, but are generally well tolerated once the skin is healing  The FDA placed a black-box warning on both Elidel and Protopic in 2006 based on animal studies using the medications  Some animals developed skin cancer and lymphoma  Subsequently, the FDA released a statement that there is no causal relationship between the two medications and cancer  Because of this concern, there are ongoing studies to evaluate this relationship in humans  So far, there are studies that support the safety of these medications  One showed that the rates of cancer in patient using these medications topically were less than the rates of the general population and another showed that in patient's using the medication over a large area of the body, the levels of the medication in the blood was undetectable  As for Eucrisa, this product is only approved for the topical treatment of mild-to-moderate eczema in patients 3years of age and older; use of the medication in kids younger than 2 is considered off label and has not been formally studied  Burning and stinging are the most commonly reported side effects of this medication  Rarely, this product has been known to cause hives and hypersensitivity reactions; discontinue its use if you develop severe itching, swelling, or redness in the area of application      Scribe Attestation    I,:  Sobia Newton am acting as a scribe while in the presence of the attending physician :       I,:  Eveline Concepcion MD personally performed the services described in this documentation    as scribed in my presence :

## 2023-01-20 DIAGNOSIS — L20.9 ATOPIC DERMATITIS, UNSPECIFIED TYPE: Primary | ICD-10-CM

## 2023-01-20 NOTE — TELEPHONE ENCOUNTER
Received fax to start PA for pt for dupixent(scanned into ), please complete and fax back to [employer] [facility], thank you- [facility]  [facility]

## 2023-02-07 RX ORDER — DUPILUMAB 300 MG/2ML
INJECTION, SOLUTION SUBCUTANEOUS
Qty: 2 ML | Refills: 11 | Status: SHIPPED | OUTPATIENT
Start: 2023-02-07

## 2023-02-07 NOTE — TELEPHONE ENCOUNTER
Faxed that was scanned was an approval letter for continuation of dupixent injections  Order has been placed

## 2023-07-19 ENCOUNTER — TELEPHONE (OUTPATIENT)
Dept: DERMATOLOGY | Facility: CLINIC | Age: 78
End: 2023-07-19

## 2023-07-26 NOTE — TELEPHONE ENCOUNTER
Called and left message informing her that we received her message and that I did place her on Dr. Darren Levin waiting list (due to no availability). I told patient that I was assuming this was for a regular check up. Requested return call if issue is more urgent and we would see what we could do. Told patient that we would contact her when we had an appointment to offer her. Left phone number for derm.

## 2023-07-26 NOTE — TELEPHONE ENCOUNTER
Patient returned call for an appointment on [facility]. Please reach back out to her. Thanks!

## 2023-07-28 NOTE — TELEPHONE ENCOUNTER
Called and left another message for patient regarding wait list and that she would be contacted with appointment availability.

## 2023-08-08 ENCOUNTER — OFFICE VISIT (OUTPATIENT)
Dept: DERMATOLOGY | Facility: CLINIC | Age: 78
End: 2023-08-08
Payer: MEDICARE

## 2023-08-08 VITALS — BODY MASS INDEX: 33.68 KG/M2 | HEIGHT: 62 IN | WEIGHT: 183 LBS | TEMPERATURE: 97.6 F

## 2023-08-08 DIAGNOSIS — L20.9 ATOPIC DERMATITIS, UNSPECIFIED TYPE: Primary | ICD-10-CM

## 2023-08-08 PROCEDURE — 99213 OFFICE O/P EST LOW 20 MIN: CPT | Performed by: DERMATOLOGY

## 2023-08-08 RX ORDER — CLOBETASOL PROPIONATE 0.5 MG/G
1 OINTMENT TOPICAL 2 TIMES DAILY
Qty: 60 G | Refills: 1 | Status: SHIPPED | OUTPATIENT
Start: 2023-08-08

## 2023-08-08 RX ORDER — DUPILUMAB 300 MG/2ML
INJECTION, SOLUTION SUBCUTANEOUS
Qty: 4 ML | Refills: 11 | Status: CANCELLED | OUTPATIENT
Start: 2023-08-08

## 2023-08-08 NOTE — PATIENT INSTRUCTIONS
1. 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 It is also known as “eczema” or “atopic eczema.” It is the most common form of dermatitis. Atopic dermatitis usually occurs in people who have an “atopic tendency.”  This means they may develop any or all of these closely linked conditions: Atopic dermatitis, asthma, hay fever (allergic rhinitis), eosinophilic esophagitis, and gastroenteritis. Often these conditions run within families with a parent, child or sibling also affected. A family history of asthma, eczema or hay fever is particularly useful in diagnosing atopic dermatitis in infants. Atopic dermatitis arises because of a complex interaction of genetic and environmental factors. These include defects in skin barrier function making the skin more susceptible to irritation by soap and other contact irritants, the weather, temperature and non-specific triggers. There is also an element of immune system dysregulation that is often present. By definition, it is chronic and has a "waxing-waning" nature; flares should be expected but with good education and treatment strategies can be minimized. Some specific tips we discussed:  Dry skin care.   Using only mild cleansers (hypoallergenic and without fragrances) and fragrance free detergent (not “unscented” products which contain a masking agent); we discussed avoiding irritants/fragranced products. The importance of regular application of moisturizers daily (at least 3 times a day)  The known and theoretical side effects of steroids at length, including but not limited to atrophy of skin and increased pressure in eye (glaucoma) and clouding of the eye's lens (cataracts) if used in or around the eye for extended durations. The specific over-the-counter interventions and medications. Side effects, risks and benefits of topical and oral medications discussed. After lengthy discussion of etiology and treatment options, we decided to implement the following personalized treatment plan:      EDUCATION AS INTERVENTION! WHAT IS ATOPIC DERMATITIS? Atopic dermatitis (also called “eczema”) is a condition of the skin where the skin is dry, red, and itchy. The main function of the skin is to provide a barrier from the environment and is also the first defense of the immune system. In atopic dermatitis the skin barrier is decreased or disrupted, and the skin is easily irritated. As a result, moisture escapes the skin more easily, and environmental allergens and microbes can enter the skin more easily. Consequently, the skin's immune system is altered. If there are increased allergic type cells in the skin, the skin may become red and “hyper-excitable.” This leads to itching and a subsequent rash. WHY DO PEOPLE GET ATOPIC DERMATITIS? There is no single answer because many factors are involved. It is likely a combination of genetic makeup and environmental triggers and/or exposures. Excessive drying or sweating of the skin, Irritating soaps, dust mites, and pet dander are some of the more common triggers. There is no blood test that can be done to confirm this diagnosis. The history and appearance of the skin is usually sufficient for a diagnosis.  However, in some cases if the rash does not fit with the history or respond appropriately to treatment, a skin biopsy may be helpful. Many children do outgrow atopic dermatitis or get better; however, many continue to have sensitive skin into adulthood. Asthma and hay fever are often seen in many patients with atopic dermatitis; however, asthma flares do not necessarily occur at the same time as skin flares. PREVENTING FLARES OF ATOPIC DERMATITIS  The first step is to maintain the skin's barrier function. Keep the skin well moisturized. Avoid irritants and triggers. Use prescribed medicine when there are red or rough areas to help the skin to return to normal as quickly as possible. Try to limit scratching. If you keep the skin well moisturized, and avoid coming in contact with things you know irritate your child's skin, there will be less flares. However, some flares of atopic dermatitis are beyond your control. You should work with your health care provider to come up with a plan that minimizes flares while minimizing long term use of medications that suppress the immune system. WHAT ARE SOME OF THE TRIGGERS? Triggers are different for different people. The most common triggers are:  Heat and sweat for some individuals, cold weather for others. House dust mites, pet fur. Wool; synthetic fabrics like nylon; dyed fabrics. Tobacco smoke   Fragrances in: shampoos, soaps, lotions, laundry detergents, fabric softeners. Saliva or prolonged exposure to water. WHAT ABOUT FOOD ALLERGIES? This is a very controversial topic, as many believe that food allergies are responsible for skin flares. In some cases, specific foods may cause worsening of atopic dermatitis; however this occurs in a minority of cases and usually happens within a few hours of ingestion. While food allergy is more common in children with eczema, foods are specific triggers for flares in only a small percentage of children.   If you notice that the skin flares after certain foods you can see if eliminating one food at time makes a difference, as long as your child can still enjoy a well-balanced diet. There are blood (RAST) and skin (PRICK) tests that can check for allergies, but they are often positive in children who are not truly allergic. Therefore it is important that you work with your allergist and dermatologist to determine which foods are relevant and causing true symptoms. Extreme food elimination diets without the guidance of your doctor, which have become more popular in recent years, may even result in worsening of the skin rash due to malnutrition and avoidance of essential nutrients. TREATMENT  Treatments are aimed at minimizing exposure to irritating factors and decreasing  the skin inflammation which results in an itchy rash. There are many different treatment options, which depend on your child's rash, its location, and severity. Topical treatments include corticosteroids and steroid-like creams such as Protopic, Elidel, and Eucrisa, which are believed to not thin the skin. Please read the discussions below regarding risks and benefits of all of these creams. Occasionally bacterial or viral infections can occur which flare the skin and require oral and/or topical antibiotics or antivirals. In some cases bleach baths 2-3 times weekly can be helpful to prevent recurrent infection. For severe disease, strong oral medications such as corticosteroids, methotrexate or azathioprine (Imuran) may be needed. These medications require close monitoring and follow-up. You should discuss the risks/ benefits/alternatives of these medications with your health care provider to come up with the best treatment plan for your child. 1) Use moisturizer all over the entire body at least THREE TIMES a day. This keeps the skin moisturized to restore the barrier function. Find a cream or ointment that your child likes - this is the most important. The medicines do not work in the bottle.   The thicker the moisturizer, generally the better barrier it provides. Ointments often moisturize better than creams; and creams work better than lotions. Lotions are more useful during the summer when thick greasy ointments are uncomfortable. If you put moisturizer on the skin after bathing, while the skin is damp, it is twice as effective. The moisturizer provides a seal holding the water in the skin. You may bathe your child in warm - not hot - water, for short periods of time (no more than 5-10 minutes at a time) once a day if they like. Lightly pat your child dry with a towel and, while the skin is still damp, (within 3 minutes) apply a moisturizer from head to toe. If your child is using a medicated cream, apply it and allow it to absorb completely BEFORE you apply the moisturizer. 2) Apply the prescription medication TWICE A DAY to only the red, rough [place] [id number] [address]  Put the medication on your fingers and gently rub it into the areas. Usually the medicine will help an area within a few days time. Try to put the medicine on for two days after you have noticed that the redness is no longer present; this will help the redness from returning. The severity of the rash and the strength and usage of the medication will determine how quickly you see improvement. It is important that you do not overuse steroid creams, and if you notice a thin, shiny appearance to the skin or broken blood vessels, you should stop using the cream and consult your health care provider regarding possible overuse/overthinning of the skin. The face, armpits and groin have particularly thin and sensitive skin and are therefore most at risk for bad results if steroids are over-used in these sites. 3) Avoid triggers. Some children have specific things that trigger itching and rashes, while others may have none that can be identified. It may require a little bit of trial and error to see what applies to your child.   Also, triggers can change over time for your child. The most common triggers are listed above; start with these. Avoid the use of fabric softeners in the washing machine or dryer sheets (unless they are fragrance-free). Try to use laundry detergents, soaps and shampoos that are fragrance-free. You may find it helpful to double-rinse your clothes. Some children are sensitive to house dust mites and they may benefit from a plastic mattress wrap. While food allergy is more common in children with eczema, foods are specific triggers for flares in only a small percentage of children. If you notice that the skin flares after certain foods you can see if eliminating one food at time makes a difference, as long as your child can still enjoy a well-balanced diet. 4) Consider using a medication like an anti-histamine by mouth to help control the itching. Scratching only makes the skin more reactive and the barrier function even more disrupted. It can cause both children and their parents to lose sleep! There are different types of anti-itch medications. Some cause more drowsiness than others. Both types are acceptable depending on your child and your preference. Start with Benadryl and if that does not work, ask for a prescription “antihistamine.”    5) About the prescription creams:  Corticosteroid creams and ointments (generally things with "-one" or "hao" on the end of their names): The strength of the cream or ointment depends on the name of the active ingredient. The numbers at the end do not indicate the relative strength. Thus triamcinolone 0.1% ointment, considered a mid-strength corticosteroid, is much stronger than hydrocortisone 1% even though the number following the name is much lower. Topical corticosteroids are very effective in treating atopic dermatitis. When used in the manner prescribed (to rashy areas of skin and for no more than a few weeks at a time to any one area) they are very safe. These are corticosteroids and are anti-inflammatory, not the “anabolic steroids” like those used illegally by some athletes. Topical non-steroid creams and ointments (immunomodulators): These creams and ointments are also called topical calcineurin inhibitors (TCIs). These include Protopic ointment and Elidel cream. Crisaborole 2% Jamie Starring) is a prescription ointment that targets an enzyme called PDE4 (phosphodiesterase 4). It is used on the skin topically to treat mild-to-moderate eczema in adults and children 3years of age and older. In total, these nonsteroidal prescriptions are used to help decrease itching and redness in the skin. They are not as strong as most steroid creams; however, it is believed that they do not thin the skin when overused. They are generally used as second-line medications, though they may be used alone or in conjunction with topical steroids. In sensitive areas such as the face, underarms or groin, they are often recommended. They can sting inflamed skin, but are generally well tolerated once the skin is healing. The FDA placed a “black-box” warning on both Elidel and Protopic in 2006 based on animal studies using the medications. Some animals developed skin cancer and lymphoma. Subsequently, the FDA released a statement that there is no causal relationship between the two medications and cancer. Because of this concern, there are ongoing studies to evaluate this relationship in humans. So far, there are studies that support the safety of these medications. One showed that the rates of cancer in patient using these medications topically were less than the rates of the general population and another showed that in patient's using the medication over a large area of the body, the levels of the medication in the blood was undetectable.     As for Eucrisa, this product is only approved for the topical treatment of mild-to-moderate eczema in patients 3years of age and older; use of the medication in kids younger than 2 is considered “off label” and has not been formally studied. Burning and stinging are the most commonly reported side effects of this medication. Rarely, this product has been known to cause hives and hypersensitivity reactions; discontinue its use if you develop severe itching, swelling, or redness in the area of application.

## 2023-08-08 NOTE — PROGRESS NOTES
[facility] Note     Patient Name: Can Hurd  Encounter Date: 8/8/2023     Have you been cared for by a [facility]atologist in the last 3 years and, if so, which description applies to you? Yes. I have been here within the last 3 years, and my medical history has NOT changed since that time. I am FEMALE/of child-bearing potential.    REVIEW OF SYSTEMS:  Have you recently had or currently have any of the following? No changes in my recent health. PAST MEDICAL HISTORY:  Have you personally ever had or currently have any of the following? If "YES," then please provide more detail. No changes in my medical history. FAMILY HISTORY:  Any "first degree relatives" (parent, brother, sister, or child) with the following? No changes in my family's known health. PATIENT EXPERIENCE:    Do you want the Dermatologist to perform a COMPLETE skin exam today including a clinical examination under the "bra and underwear" areas? NO  If necessary, do we have your permission to call and leave a detailed message on your Preferred Phone number that includes your specific medical information?   Yes      Allergies   Allergen Reactions   • Penicillins Anaphylaxis and Rash   • Irbesartan Angioedema   • Niacin Rash   • Statins Rash      Current Outpatient Medications:   •  Breo Ellipta 200-25 MCG/INH inhaler, TAKE 1 PUFF BY MOUTH DAILY, Disp: , Rfl:   •  clobetasol (TEMOVATE) 0.05 % ointment, Apply 1 Application topically 2 (two) times a day To affected area, Disp: 60 g, Rfl: 1  •  cyclobenzaprine (FLEXERIL) 5 mg tablet, Take 5 mg by mouth 3 (three) times a day as needed, Disp: , Rfl:   •  Dupilumab (Dupixent) 300 MG/2ML SOPN, Inject 300 mg (1 pen) subcutaneously every other week for maintenance dosing., Disp: 4 mL, Rfl: 11  •  escitalopram (LEXAPRO) 10 mg tablet, Take 10 mg by mouth daily, Disp: , Rfl:   •  ezetimibe (ZETIA) 10 mg tablet, Take 10 mg by mouth daily, Disp: , Rfl:   • ipratropium-albuterol (DUO-NEB) 0.5-2.5 mg/3 mL nebulizer solution, Inhale 3 mL, Disp: , Rfl:   •  latanoprost (XALATAN) 0.005 % ophthalmic solution, Administer 1 drop to both eyes once at bedtime, Disp: , Rfl:   •  metoprolol tartrate (LOPRESSOR) 25 mg tablet, , Disp: , Rfl:   •  sitaGLIPtin-metFORMIN (JANUMET)  MG per tablet, Take 1 tablet by mouth 2 (two) times a day with meals, Disp: , Rfl:   •  aspirin 81 mg chewable tablet, Chew 81 mg daily (Patient not taking: Reported on 8/8/2023), Disp: , Rfl:   •  budesonide-formoterol (SYMBICORT) 160-4.5 mcg/act inhaler, TAKE 2 PUFFS BY MOUTH TWICE A DAY (Patient not taking: Reported on 8/8/2023), Disp: , Rfl:   •  ferrous sulfate 325 (65 Fe) mg tablet, Take 325 mg by mouth, Disp: , Rfl:   •  fluconazole (DIFLUCAN) 150 mg tablet, TAKE 1 TABLET BY MOUTH AS ONE DOSE (Patient not taking: Reported on 9/13/2022), Disp: , Rfl:   •  glimepiride (AMARYL) 4 mg tablet, Take 4 mg by mouth daily with breakfast (Patient not taking: Reported on 8/8/2023), Disp: , Rfl:   •  hydrochlorothiazide (HYDRODIURIL) 25 mg tablet, Take 25 mg by mouth daily (Patient not taking: Reported on 8/8/2023), Disp: , Rfl:   •  hydrOXYzine HCL (ATARAX) 10 mg tablet, TAKE 1 TABLET BY MOUTH TWICE A DAY AS NEEDED FOR ITCHING (TAKE AT NIGHT BEFORE BED). (Patient not taking: Reported on 8/8/2023), Disp: , Rfl:   •  liraglutide (SAXENDA) injection, Inject 1.8 mg under the skin (Patient not taking: Reported on 9/13/2022), Disp: , Rfl:   •  tiotropium (Spiriva Respimat) 2.5 MCG/ACT AERS inhaler, Inhale 2 puffs (Patient not taking: Reported on 9/13/2022), Disp: , Rfl:   •  umeclidinium bromide (Incruse Ellipta) 62.5 mcg/inh AEPB inhaler, every 24 hours. , Disp: , Rfl:           Whom besides the patient is providing clinical information about today's encounter? NO ADDITIONAL HISTORIAN (patient alone provided history)    Physical Exam and Assessment/Plan by Diagnosis:    1.  ATOPIC DERMATITIS IN ASSOCIATION WITH PRURIGO NODULARIS    Physical Exam:  Anatomic Location Affected:  Bilateral arms, legs  Morphological Description:   Few nodules, much improved  Overall Severity: moderate    Additional History of Present Condition:  Patient is currently on DUpixent fro atopic dermatitis. Patient states her rash has improved since being on Dupixent. Assessment and Plan:  Based on a thorough discussion of this condition and the management approach to it (including a comprehensive discussion of the known risks, side effects and potential benefits of treatment), the patient (family) agrees to implement the following specific plan:  Continue Dupixent  Continue clobetasol ointment  Continue to wear knee high compression stockings     Assessment and Plan:   Atopic Dermatitis is a chronic, itchy skin condition that is very common in children but may occur at any age. It is also known as “eczema” or “atopic eczema.” It is the most common form of dermatitis. Atopic dermatitis usually occurs in people who have an “atopic tendency.”  This means they may develop any or all of these closely linked conditions: Atopic dermatitis, asthma, hay fever (allergic rhinitis), eosinophilic esophagitis, and gastroenteritis. Often these conditions run within families with a parent, child or sibling also affected. A family history of asthma, eczema or hay fever is particularly useful in diagnosing atopic dermatitis in infants. Atopic dermatitis arises because of a complex interaction of genetic and environmental factors. These include defects in skin barrier function making the skin more susceptible to irritation by soap and other contact irritants, the weather, temperature and non-specific triggers. There is also an element of immune system dysregulation that is often present. By definition, it is chronic and has a "waxing-waning" nature; flares should be expected but with good education and treatment strategies can be minimized.     Some specific tips we discussed:  Dry skin care. Using only mild cleansers (hypoallergenic and without fragrances) and fragrance free detergent (not “unscented” products which contain a masking agent); we discussed avoiding irritants/fragranced products. The importance of regular application of moisturizers daily (at least 3 times a day)  The known and theoretical side effects of steroids at length, including but not limited to atrophy of skin and increased pressure in eye (glaucoma) and clouding of the eye's lens (cataracts) if used in or around the eye for extended durations. The specific over-the-counter interventions and medications. Side effects, risks and benefits of topical and oral medications discussed. After lengthy discussion of etiology and treatment options, we decided to implement the following personalized treatment plan:      EDUCATION AS INTERVENTION! WHAT IS ATOPIC DERMATITIS? Atopic dermatitis (also called “eczema”) is a condition of the skin where the skin is dry, red, and itchy. The main function of the skin is to provide a barrier from the environment and is also the first defense of the immune system. In atopic dermatitis the skin barrier is decreased or disrupted, and the skin is easily irritated. As a result, moisture escapes the skin more easily, and environmental allergens and microbes can enter the skin more easily. Consequently, the skin's immune system is altered. If there are increased allergic type cells in the skin, the skin may become red and “hyper-excitable.” This leads to itching and a subsequent rash. WHY DO PEOPLE GET ATOPIC DERMATITIS? There is no single answer because many factors are involved. It is likely a combination of genetic makeup and environmental triggers and/or exposures. Excessive drying or sweating of the skin, Irritating soaps, dust mites, and pet dander are some of the more common triggers.   There is no blood test that can be done to confirm this diagnosis. The history and appearance of the skin is usually sufficient for a diagnosis. However, in some cases if the rash does not fit with the history or respond appropriately to treatment, a skin biopsy may be helpful. Many children do outgrow atopic dermatitis or get better; however, many continue to have sensitive skin into adulthood. Asthma and hay fever are often seen in many patients with atopic dermatitis; however, asthma flares do not necessarily occur at the same time as skin flares. PREVENTING FLARES OF ATOPIC DERMATITIS  The first step is to maintain the skin's barrier function. Keep the skin well moisturized. Avoid irritants and triggers. Use prescribed medicine when there are red or rough areas to help the skin to return to normal as quickly as possible. Try to limit scratching. If you keep the skin well moisturized, and avoid coming in contact with things you know irritate your child's skin, there will be less flares. However, some flares of atopic dermatitis are beyond your control. You should work with your health care provider to come up with a plan that minimizes flares while minimizing long term use of medications that suppress the immune system. WHAT ARE SOME OF THE TRIGGERS? Triggers are different for different people. The most common triggers are:  Heat and sweat for some individuals, cold weather for others. House dust mites, pet fur. Wool; synthetic fabrics like nylon; dyed fabrics. Tobacco smoke   Fragrances in: shampoos, soaps, lotions, laundry detergents, fabric softeners. Saliva or prolonged exposure to water. WHAT ABOUT FOOD ALLERGIES? This is a very controversial topic, as many believe that food allergies are responsible for skin flares. In some cases, specific foods may cause worsening of atopic dermatitis; however this occurs in a minority of cases and usually happens within a few hours of ingestion.  While food allergy is more common in children with eczema, foods are specific triggers for flares in only a small percentage of children. If you notice that the skin flares after certain foods you can see if eliminating one food at time makes a difference, as long as your child can still enjoy a well-balanced diet. There are blood (RAST) and skin (PRICK) tests that can check for allergies, but they are often positive in children who are not truly allergic. Therefore it is important that you work with your allergist and dermatologist to determine which foods are relevant and causing true symptoms. Extreme food elimination diets without the guidance of your doctor, which have become more popular in recent years, may even result in worsening of the skin rash due to malnutrition and avoidance of essential nutrients. TREATMENT  Treatments are aimed at minimizing exposure to irritating factors and decreasing  the skin inflammation which results in an itchy rash. There are many different treatment options, which depend on your child's rash, its location, and severity. Topical treatments include corticosteroids and steroid-like creams such as Protopic, Elidel, and Eucrisa, which are believed to not thin the skin. Please read the discussions below regarding risks and benefits of all of these creams. Occasionally bacterial or viral infections can occur which flare the skin and require oral and/or topical antibiotics or antivirals. In some cases bleach baths 2-3 times weekly can be helpful to prevent recurrent infection. For severe disease, strong oral medications such as corticosteroids, methotrexate or azathioprine (Imuran) may be needed. These medications require close monitoring and follow-up. You should discuss the risks/ benefits/alternatives of these medications with your health care provider to come up with the best treatment plan for your child. 1) Use moisturizer all over the entire body at least THREE TIMES a day.   This keeps the skin moisturized to restore the barrier function. Find a cream or ointment that your child likes - this is the most important. The medicines do not work in the bottle. The thicker the moisturizer, generally the better barrier it provides. Ointments often moisturize better than creams; and creams work better than lotions. Lotions are more useful during the summer when thick greasy ointments are uncomfortable. If you put moisturizer on the skin after bathing, while the skin is damp, it is twice as effective. The moisturizer provides a seal holding the water in the skin. You may bathe your child in warm - not hot - water, for short periods of time (no more than 5-10 minutes at a time) once a day if they like. Lightly pat your child dry with a towel and, while the skin is still damp, (within 3 minutes) apply a moisturizer from head to toe. If your child is using a medicated cream, apply it and allow it to absorb completely BEFORE you apply the moisturizer. 2) Apply the prescription medication TWICE A DAY to only the red, [place] [address] [facility]  Put the medication on your fingers and gently rub it into the areas. Usually the medicine will help an area within a few days time. Try to put the medicine on for two days after you have noticed that the redness is no longer present; this will help the redness from returning. The severity of the rash and the strength and usage of the medication will determine how quickly you see improvement. It is important that you do not overuse steroid creams, and if you notice a thin, shiny appearance to the skin or broken blood vessels, you should stop using the cream and consult your health care provider regarding possible overuse/overthinning of the skin. The face, armpits and groin have particularly thin and sensitive skin and are therefore most at risk for bad results if steroids are over-used in these sites.       3) Avoid triggers. Some children have specific things that trigger itching and rashes, while others may have none that can be identified. It may require a little bit of trial and error to see what applies to your child. Also, triggers can change over time for your child. The most common triggers are listed above; start with these. Avoid the use of fabric softeners in the washing machine or dryer sheets (unless they are fragrance-free). Try to use laundry detergents, soaps and shampoos that are fragrance-free. You may find it helpful to double-rinse your clothes. Some children are sensitive to house dust mites and they may benefit from a plastic mattress wrap. While food allergy is more common in children with eczema, foods are specific triggers for flares in only a small percentage of children. If you notice that the skin flares after certain foods you can see if eliminating one food at time makes a difference, as long as your child can still enjoy a well-balanced diet. 4) Consider using a medication like an anti-histamine by mouth to help control the itching. Scratching only makes the skin more reactive and the barrier function even more disrupted. It can cause both children and their parents to lose sleep! There are different types of anti-itch medications. Some cause more drowsiness than others. Both types are acceptable depending on your child and your preference. Start with Benadryl and if that does not work, ask for a prescription “antihistamine.”    5) About the prescription creams:  Corticosteroid creams and ointments (generally things with "-one" or "hao" on the end of their names): The strength of the cream or ointment depends on the name of the active ingredient. The numbers at the end do not indicate the relative strength.   Thus triamcinolone 0.1% ointment, considered a mid-strength corticosteroid, is much stronger than hydrocortisone 1% even though the number following the name is much lower. Topical corticosteroids are very effective in treating atopic dermatitis. When used in the manner prescribed (to rashy areas of skin and for no more than a few weeks at a time to any one area) they are very safe. These are corticosteroids and are anti-inflammatory, not the “anabolic steroids” like those used illegally by some athletes. Topical non-steroid creams and ointments (immunomodulators): These creams and ointments are also called topical calcineurin inhibitors (TCIs). These include Protopic ointment and Elidel cream. Crisaborole 2% Appiah Cohen) is a prescription ointment that targets an enzyme called PDE4 (phosphodiesterase 4). It is used on the skin topically to treat mild-to-moderate eczema in adults and children 3years of age and older. In total, these nonsteroidal prescriptions are used to help decrease itching and redness in the skin. They are not as strong as most steroid creams; however, it is believed that they do not thin the skin when overused. They are generally used as second-line medications, though they may be used alone or in conjunction with topical steroids. In sensitive areas such as the face, underarms or groin, they are often recommended. They can sting inflamed skin, but are generally well tolerated once the skin is healing. The FDA placed a “black-box” warning on both Elidel and Protopic in 2006 based on animal studies using the medications. Some animals developed skin cancer and lymphoma. Subsequently, the FDA released a statement that there is no causal relationship between the two medications and cancer. Because of this concern, there are ongoing studies to evaluate this relationship in humans. So far, there are studies that support the safety of these medications.   One showed that the rates of cancer in patient using these medications topically were less than the rates of the general population and another showed that in patient's using the medication over a large area of the body, the levels of the medication in the blood was undetectable. As for Eucrisa, this product is only approved for the topical treatment of mild-to-moderate eczema in patients 3years of age and older; use of the medication in kids younger than 2 is considered “off label” and has not been formally studied. Burning and stinging are the most commonly reported side effects of this medication. Rarely, this product has been known to cause hives and hypersensitivity reactions; discontinue its use if you develop severe itching, swelling, or redness in the area of application.     Scribe Attestation    I,:  Vivien Mathews MA am acting as a scribe while in the presence of the attending physician.:       I,:  Gulshan Stockton MD personally performed the services described in this documentation    as scribed in my presence.:

## 2023-09-01 ENCOUNTER — NURSE TRIAGE (OUTPATIENT)
Age: 78
End: 2023-09-01

## 2023-09-01 NOTE — TELEPHONE ENCOUNTER
Submitted prior authorization for continuation of Dupixent 300mg and was dismissed due to patient having an active Authorization on file for approval that's valid until 02/24/2024

## 2024-02-28 ENCOUNTER — TELEPHONE (OUTPATIENT)
Dept: DERMATOLOGY | Facility: CLINIC | Age: 79
End: 2024-02-28

## 2024-02-28 NOTE — LETTER
ATTN: Appeals Department     Patient: Sarai Barnett :1945 Member ID:[id number]   Re: Request for Reconsideration of Dupixent 300mg pen       To Whom It May Concern:   Sarai Barnett 1945 was denied coverage of Dupixent 300mg pen on 2024. The denial reason was stated as not medically necessary. We are asking for reconsideration due to patient being on medication since 2021 and has shown clinical improvement while on the medication, she is clear from her Atopic Dermatitis. I have attached clinical notes to this appeal letter.     In conclusion, please reconsider the denial Dupixent 300mg pen for my patient. I would appreciate prompt review of this appeal and approval of this FDA-approved medication. I can be reached at office phone number [phone] or direct fax number at [phone] for additional information and discussion. Thank you.      Sincerely,    John Montiel MD.

## 2024-02-29 NOTE — TELEPHONE ENCOUNTER
Received fax from [employer]. They are requesting additional information to process request for Dupixent. Form scanned into chart for review/completion.

## 2024-03-05 NOTE — TELEPHONE ENCOUNTER
Patient called stating she received a letter that dupixent is not on the list of covered medications, I reached out to the Pa team they will contact her insurance and see if an exception is even a possibility.

## 2024-03-05 NOTE — TELEPHONE ENCOUNTER
Contacted [employer] regarding Letter received stating pt's drug is not a formulary drug. Spoke to representative Jason SAUCEDO and asked for clarification, she stated that this was a denial for the coverage of Dupixent that was submitted on 03/01/2024. I asked if he had more information regarding the request because there was no documentation on our end that an authorization was even submitted. He stated that Patient had called asking for coverage thus starting the PA process but they were never provided with additional clinical information. I asked for information on how to dismiss initial request and he said an appeal can be faxed over to fax# [phone].      I contacted patient and made her aware of update, I stated the if there is an authorization that is needed in the future to contact the office before reaching out to her insurance to prevent future confusion and delays. She confirmed understanding. I informed her that an appeal would be submitted today but could take anywhere from 15-30 days and I would reach out to her once I received a decision.

## 2024-03-05 NOTE — TELEPHONE ENCOUNTER
PA for Dupixent 300mg pen appealed via     []CMM  []SS  [x]Letter sent to insurance via fax [phone]  []Other site or means     All necessary records sent. Will await response from insurance company    Turnaround time for a decision to be made on an appeal could take up to 30 business days

## 2024-04-13 DIAGNOSIS — L20.9 ATOPIC DERMATITIS, UNSPECIFIED TYPE: ICD-10-CM

## 2024-04-15 RX ORDER — CLOBETASOL PROPIONATE 0.5 MG/G
OINTMENT TOPICAL
Qty: 60 G | Refills: 1 | Status: SHIPPED | OUTPATIENT
Start: 2024-04-15

## 2024-06-25 DIAGNOSIS — L20.9 ATOPIC DERMATITIS, UNSPECIFIED TYPE: ICD-10-CM

## 2024-06-25 RX ORDER — DUPILUMAB 300 MG/2ML
INJECTION, SOLUTION SUBCUTANEOUS
Qty: 2 ML | Refills: 11 | Status: SHIPPED | OUTPATIENT
Start: 2024-06-25

## 2025-03-19 ENCOUNTER — TELEPHONE (OUTPATIENT)
Dept: DERMATOLOGY | Facility: CLINIC | Age: 80
End: 2025-03-19

## 2025-03-19 NOTE — TELEPHONE ENCOUNTER
Rec'd Fax Advising Dupixent /2ML is not on Formulary or Subject to PA    Scanned into chart and sent to [facility] PA

## 2025-03-20 NOTE — TELEPHONE ENCOUNTER
Called Pt and LVM advising to callback to schedule appt.  Advised Pt the [facility] or [facility] office would be closest to her address ....Per Judy, schedule with an AP.  Also, when Pt calls, please verify Insurance and Member IDs along with RX card if she has one.          FEROZ Mukherjee MA; [facility] hour ago (12:17 PM)       Can schedule with an AP.     Hannah Thacker MA  [facility]; ERIC Mukherjee hours ago (11:43 AM)     ES  Patient hasn't been seen in office since August of 2023, I need an updated clinical note as well as new insurance information in chart before I submit. Can we please have patient schedule an office visit so we can submit prior authorization on her behalf? Thank you